# Patient Record
Sex: FEMALE
[De-identification: names, ages, dates, MRNs, and addresses within clinical notes are randomized per-mention and may not be internally consistent; named-entity substitution may affect disease eponyms.]

---

## 2017-01-09 ENCOUNTER — CHARTING TRANS (OUTPATIENT)
Dept: OTHER | Age: 82
End: 2017-01-09

## 2017-01-09 ENCOUNTER — LAB SERVICES (OUTPATIENT)
Dept: OTHER | Age: 82
End: 2017-01-09

## 2017-01-13 ENCOUNTER — CHARTING TRANS (OUTPATIENT)
Dept: OTHER | Age: 82
End: 2017-01-13

## 2017-01-13 LAB
ALBUMIN SERPL-MCNC: 3.7 G/DL (ref 3.6–5.1)
ALBUMIN/GLOB SERPL: 1.1 (ref 1–2.4)
ALP SERPL-CCNC: 89 UNITS/L (ref 45–117)
ALT SERPL-CCNC: 13 UNITS/L
ANION GAP SERPL CALC-SCNC: 14 MMOL/L (ref 10–20)
AST SERPL-CCNC: 16 UNITS/L
BASOPHILS # BLD: 0 K/MCL (ref 0–0.3)
BASOPHILS NFR BLD: 1 %
BILIRUB SERPL-MCNC: 0.6 MG/DL (ref 0.2–1)
BUN SERPL-MCNC: 19 MG/DL (ref 10–20)
BUN/CREAT SERPL: 25 (ref 7–25)
CALCIUM SERPL-MCNC: 9.2 MG/DL (ref 8.4–10.2)
CHLORIDE SERPL-SCNC: 106 MMOL/L (ref 98–107)
CHOLEST SERPL-MCNC: 188 MG/DL (ref 100–200)
CHOLEST/HDLC SERPL: 3.5
CO2 SERPL-SCNC: 26 MMOL/L (ref 21–32)
CREAT SERPL-MCNC: 0.76 MG/DL (ref 0.51–0.95)
CREAT UR-MCNC: 156 MG/DL
DIFFERENTIAL METHOD BLD: NORMAL
EOSINOPHIL # BLD: 0.1 K/MCL (ref 0.1–0.5)
EOSINOPHIL NFR BLD: 2 %
ERYTHROCYTE [DISTWIDTH] IN BLOOD: 13.9 % (ref 11–15)
GLOBULIN SER-MCNC: 3.4 G/DL (ref 2–4)
GLUCOSE SERPL-MCNC: 80 MG/DL (ref 65–99)
HBA1C MFR BLD: 6.3 % (ref 4.5–5.6)
HDLC SERPL-MCNC: 54 MG/DL
HEMATOCRIT: 38.2 % (ref 36–46.5)
HEMOGLOBIN: 12 G/DL (ref 12–15.5)
LDLC SERPL CALC-MCNC: 97 MG/DL
LENGTH OF FAST TIME PATIENT: ABNORMAL HRS
LENGTH OF FAST TIME PATIENT: NORMAL HRS
LYMPHOCYTES # BLD: 1.6 K/MCL (ref 1–4)
LYMPHOCYTES NFR BLD: 28 %
MEAN CORPUSCULAR HEMOGLOBIN: 28.1 PG (ref 26–34)
MEAN CORPUSCULAR HGB CONC: 31.4 G/DL (ref 32–36.5)
MEAN CORPUSCULAR VOLUME: 89.5 FL (ref 78–100)
MONOCYTES # BLD: 0.4 K/MCL (ref 0.3–0.9)
MONOCYTES NFR BLD: 7 %
NEUTROPHILS # BLD: 3.5 K/MCL (ref 1.8–7.7)
NEUTROPHILS NFR BLD: 62 %
NONHDLC SERPL-MCNC: 134 MG/DL
PLATELET COUNT: 273 K/MCL (ref 140–450)
POTASSIUM SERPL-SCNC: 4.1 MMOL/L (ref 3.4–5.1)
PROT UR-MCNC: 14 MG/DL
PROT/CREAT UR: 90 MGPR/GCR
RED CELL COUNT: 4.27 MIL/MCL (ref 4–5.2)
SODIUM SERPL-SCNC: 142 MMOL/L (ref 135–145)
TOTAL PROTEIN: 7.1 G/DL (ref 6.4–8.2)
TRIGL SERPL-MCNC: 186 MG/DL
TSH SERPL-ACNC: 1.05 MCUNITS/ML (ref 0.35–5)
WHITE BLOOD COUNT: 5.6 K/MCL (ref 4.2–11)

## 2017-01-22 ENCOUNTER — APPOINTMENT (OUTPATIENT)
Dept: CT IMAGING | Facility: HOSPITAL | Age: 82
End: 2017-01-22
Attending: EMERGENCY MEDICINE
Payer: MEDICARE

## 2017-01-22 ENCOUNTER — APPOINTMENT (OUTPATIENT)
Dept: GENERAL RADIOLOGY | Facility: HOSPITAL | Age: 82
End: 2017-01-22
Attending: EMERGENCY MEDICINE
Payer: MEDICARE

## 2017-01-22 ENCOUNTER — HOSPITAL ENCOUNTER (EMERGENCY)
Facility: HOSPITAL | Age: 82
Discharge: HOME | End: 2017-01-22
Attending: EMERGENCY MEDICINE
Payer: MEDICARE

## 2017-01-22 VITALS
OXYGEN SATURATION: 95 % | SYSTOLIC BLOOD PRESSURE: 138 MMHG | BODY MASS INDEX: 18.89 KG/M2 | RESPIRATION RATE: 16 BRPM | WEIGHT: 90 LBS | DIASTOLIC BLOOD PRESSURE: 44 MMHG | HEART RATE: 65 BPM | HEIGHT: 58 IN

## 2017-01-22 DIAGNOSIS — M54.50 BACK PAIN, LUMBOSACRAL: Primary | ICD-10-CM

## 2017-01-22 LAB
ALBUMIN SERPL BCP-MCNC: 3.4 G/DL (ref 3.5–4.8)
ALP SERPL-CCNC: 141 U/L (ref 32–100)
ALT SERPL-CCNC: 24 U/L (ref 14–54)
ANION GAP SERPL CALC-SCNC: 4 MMOL/L (ref 0–18)
AST SERPL-CCNC: 28 U/L (ref 15–41)
BASOPHILS # BLD: 0.1 K/UL (ref 0–0.2)
BASOPHILS NFR BLD: 1 %
BILIRUB DIRECT SERPL-MCNC: 0.1 MG/DL (ref 0–0.2)
BILIRUB SERPL-MCNC: 1 MG/DL (ref 0.3–1.2)
BILIRUB UR QL: NEGATIVE
BUN SERPL-MCNC: 20 MG/DL (ref 8–20)
BUN/CREAT SERPL: 22.7 (ref 10–20)
CALCIUM SERPL-MCNC: 9.3 MG/DL (ref 8.5–10.5)
CHLORIDE SERPL-SCNC: 106 MMOL/L (ref 95–110)
CLARITY UR: CLEAR
CO2 SERPL-SCNC: 30 MMOL/L (ref 22–32)
COLOR UR: YELLOW
CREAT SERPL-MCNC: 0.88 MG/DL (ref 0.5–1.5)
EOSINOPHIL # BLD: 0.2 K/UL (ref 0–0.7)
EOSINOPHIL NFR BLD: 3 %
ERYTHROCYTE [DISTWIDTH] IN BLOOD BY AUTOMATED COUNT: 14.5 % (ref 11–15)
GLUCOSE SERPL-MCNC: 105 MG/DL (ref 70–99)
HCT VFR BLD AUTO: 35.1 % (ref 35–48)
HGB BLD-MCNC: 11.5 G/DL (ref 12–16)
HYALINE CASTS #/AREA URNS AUTO: 1 /LPF
KETONES UR-MCNC: NEGATIVE MG/DL
LEUKOCYTE ESTERASE UR QL STRIP.AUTO: NEGATIVE
LYMPHOCYTES # BLD: 1.2 K/UL (ref 1–4)
LYMPHOCYTES NFR BLD: 20 %
MCH RBC QN AUTO: 28.2 PG (ref 27–32)
MCHC RBC AUTO-ENTMCNC: 32.8 G/DL (ref 32–37)
MCV RBC AUTO: 86 FL (ref 80–100)
MONOCYTES # BLD: 0.6 K/UL (ref 0–1)
MONOCYTES NFR BLD: 9 %
NEUTROPHILS # BLD AUTO: 4.2 K/UL (ref 1.8–7.7)
NEUTROPHILS NFR BLD: 67 %
NITRITE UR QL STRIP.AUTO: NEGATIVE
OSMOLALITY UR CALC.SUM OF ELEC: 293 MOSM/KG (ref 275–295)
PH UR: 5 [PH] (ref 5–8)
PLATELET # BLD AUTO: 235 K/UL (ref 140–400)
PMV BLD AUTO: 8.3 FL (ref 7.4–10.3)
POTASSIUM SERPL-SCNC: 3.5 MMOL/L (ref 3.3–5.1)
PROT SERPL-MCNC: 7 G/DL (ref 5.9–8.4)
PROT UR-MCNC: NEGATIVE MG/DL
RBC # BLD AUTO: 4.08 M/UL (ref 3.7–5.4)
RBC #/AREA URNS AUTO: 4 /HPF
SODIUM SERPL-SCNC: 140 MMOL/L (ref 136–144)
SP GR UR STRIP: 1.02 (ref 1–1.03)
UROBILINOGEN UR STRIP-ACNC: <2
VIT C UR-MCNC: NEGATIVE MG/DL
WBC # BLD AUTO: 6.2 K/UL (ref 4–11)
WBC #/AREA URNS AUTO: 3 /HPF

## 2017-01-22 PROCEDURE — 36415 COLL VENOUS BLD VENIPUNCTURE: CPT

## 2017-01-22 PROCEDURE — 80076 HEPATIC FUNCTION PANEL: CPT | Performed by: EMERGENCY MEDICINE

## 2017-01-22 PROCEDURE — 99284 EMERGENCY DEPT VISIT MOD MDM: CPT

## 2017-01-22 PROCEDURE — 81001 URINALYSIS AUTO W/SCOPE: CPT | Performed by: EMERGENCY MEDICINE

## 2017-01-22 PROCEDURE — 85025 COMPLETE CBC W/AUTO DIFF WBC: CPT | Performed by: EMERGENCY MEDICINE

## 2017-01-22 PROCEDURE — 74177 CT ABD & PELVIS W/CONTRAST: CPT

## 2017-01-22 PROCEDURE — 80048 BASIC METABOLIC PNL TOTAL CA: CPT | Performed by: EMERGENCY MEDICINE

## 2017-01-22 PROCEDURE — 72100 X-RAY EXAM L-S SPINE 2/3 VWS: CPT

## 2017-01-22 RX ORDER — ACETAMINOPHEN 325 MG/1
650 TABLET ORAL ONCE
Status: COMPLETED | OUTPATIENT
Start: 2017-01-22 | End: 2017-01-22

## 2017-01-22 RX ORDER — HYDROCODONE BITARTRATE AND ACETAMINOPHEN 5; 325 MG/1; MG/1
1 TABLET ORAL EVERY 6 HOURS PRN
Qty: 8 TABLET | Refills: 0 | Status: SHIPPED | OUTPATIENT
Start: 2017-01-22 | End: 2017-01-29

## 2017-01-22 RX ORDER — HYDROCODONE BITARTRATE AND ACETAMINOPHEN 5; 325 MG/1; MG/1
1 TABLET ORAL ONCE
Status: COMPLETED | OUTPATIENT
Start: 2017-01-22 | End: 2017-01-22

## 2017-01-22 NOTE — ED PROVIDER NOTES
Patient Seen in: [facility] AND [facility] Emergency Department    History   Patient presents with:  Back Pain (musculoskeletal)    Stated Complaint:     HPI    27-year-old female presents for evaluation of back pain.   Patient reports left lower back pain for th 1 TABLET DAILY   Calcium 600 MG Oral Tab,  1 TABLET TWICE DAILY WITH FOOD   Zafirlukast 20 MG Oral Tab,  1 TABLET TWICE DAILY ON EMPTY STOMACH       History reviewed. No pertinent family history.       Smoking Status: Former Smoker                   Packs/D intact throughout   Skin: Skin is warm and dry. No rash noted. Psychiatric: She has a normal mood and affect. Nursing note and vitals reviewed.           ED Course     Labs Reviewed   BASIC METABOLIC PANEL (8) - Abnormal; Notable for the following: bowel resection with reanastomosis in the region of the mid transverse colon.  No obstruction or inflammation of the bowel. 5.  Coronary artery calcifications.     XR lumbar spine CONCLUSION:    The osseous structures are demineralized.  There are vertebra

## 2017-01-22 NOTE — ED INITIAL ASSESSMENT (HPI)
Pt from home, lives at home alone, ambulates with steady gait. C/o back pain, has chronic back pain that has not changed recently. Pt denies SOB/CP.  pts daughter didn't want to bring patient to the ER and felt it would be less of a wait to come by ambulanc

## 2017-01-24 ENCOUNTER — HOSPITAL (OUTPATIENT)
Dept: OTHER | Age: 82
End: 2017-01-24
Attending: INTERNAL MEDICINE

## 2017-01-24 ENCOUNTER — IMAGING SERVICES (OUTPATIENT)
Dept: OTHER | Age: 82
End: 2017-01-24

## 2017-01-24 LAB
ALBUMIN SERPL-MCNC: 3.6 GM/DL (ref 3.6–5.1)
ALP SERPL-CCNC: 158 UNIT/L (ref 45–117)
ALT SERPL-CCNC: 24 UNIT/L
ANALYZER ANC (IANC): ABNORMAL
ANION GAP SERPL CALC-SCNC: 11 MMOL/L (ref 10–20)
AST SERPL-CCNC: 18 UNIT/L
BASOPHILS # BLD: 0 THOUSAND/MCL (ref 0–0.3)
BASOPHILS NFR BLD: 0 %
BILIRUB CONJ SERPL-MCNC: 0.1 MG/DL (ref 0–0.2)
BILIRUB SERPL-MCNC: 0.5 MG/DL (ref 0.2–1)
BUN SERPL-MCNC: 17 MG/DL (ref 10–20)
BUN/CREAT SERPL: 20 (ref 7–25)
CALCIUM SERPL-MCNC: 9.6 MG/DL (ref 8.4–10.2)
CHLORIDE: 106 MMOL/L (ref 98–107)
CO2 SERPL-SCNC: 29 MMOL/L (ref 21–32)
CREAT SERPL-MCNC: 0.87 MG/DL (ref 0.51–0.95)
DIFFERENTIAL METHOD BLD: ABNORMAL
EOSINOPHIL # BLD: 0.2 THOUSAND/MCL (ref 0.1–0.5)
EOSINOPHIL NFR BLD: 4 %
ERYTHROCYTE [DISTWIDTH] IN BLOOD: 13.9 % (ref 11–15)
GLUCOSE SERPL-MCNC: 146 MG/DL (ref 65–99)
HEMATOCRIT: 37.4 % (ref 36–46.5)
HGB BLD-MCNC: 12.3 GM/DL (ref 12–15.5)
LACTATE BLDV-MCNC: 1.5 MMOL/L
LIPASE SERPL-CCNC: 118 UNIT/L (ref 73–393)
LYMPHOCYTES # BLD: 0.9 THOUSAND/MCL (ref 1–4)
LYMPHOCYTES NFR BLD: 15 %
MCH RBC QN AUTO: 28.9 PG (ref 26–34)
MCHC RBC AUTO-ENTMCNC: 32.9 GM/DL (ref 32–36.5)
MCV RBC AUTO: 88 FL (ref 78–100)
MONOCYTES # BLD: 0.5 THOUSAND/MCL (ref 0.3–0.9)
MONOCYTES NFR BLD: 7 %
NEUTROPHILS # BLD: 4.7 THOUSAND/MCL (ref 1.8–7.7)
NEUTROPHILS NFR BLD: 74 %
NEUTS SEG NFR BLD: ABNORMAL %
PERCENT NRBC: ABNORMAL
PLATELET # BLD: 304 THOUSAND/MCL (ref 140–450)
POTASSIUM SERPL-SCNC: 3.3 MMOL/L (ref 3.4–5.1)
PROT SERPL-MCNC: 7.8 GM/DL (ref 6.4–8.2)
RBC # BLD: 4.25 MILLION/MCL (ref 4–5.2)
SODIUM SERPL-SCNC: 143 MMOL/L (ref 135–145)
WBC # BLD: 6.3 THOUSAND/MCL (ref 4.2–11)

## 2017-01-25 LAB
CREAT SERPL-MCNC: 0.78 MG/DL (ref 0.51–0.95)
GLUCOSE BLDC GLUCOMTR-MCNC: 101 MG/DL (ref 65–99)
GLUCOSE BLDC GLUCOMTR-MCNC: 144 MG/DL (ref 65–99)
GLUCOSE BLDC GLUCOMTR-MCNC: 70 MG/DL (ref 65–99)
GLUCOSE BLDC GLUCOMTR-MCNC: 84 MG/DL (ref 65–99)
GLUCOSE BLDC GLUCOMTR-MCNC: 85 MG/DL (ref 65–99)
MAGNESIUM SERPL-MCNC: 1.9 MG/DL (ref 1.7–2.4)
POTASSIUM SERPL-SCNC: 3.5 MMOL/L (ref 3.4–5.1)

## 2017-01-26 ENCOUNTER — CHARTING TRANS (OUTPATIENT)
Dept: OTHER | Age: 82
End: 2017-01-26

## 2017-01-26 LAB
CREAT SERPL-MCNC: 0.73 MG/DL (ref 0.51–0.95)
GLUCOSE BLDC GLUCOMTR-MCNC: 100 MG/DL (ref 65–99)
GLUCOSE BLDC GLUCOMTR-MCNC: 84 MG/DL (ref 65–99)
POTASSIUM SERPL-SCNC: 4.4 MMOL/L (ref 3.4–5.1)

## 2017-09-11 ENCOUNTER — LAB SERVICES (OUTPATIENT)
Dept: OTHER | Age: 82
End: 2017-09-11

## 2017-09-11 ENCOUNTER — IMAGING SERVICES (OUTPATIENT)
Dept: OTHER | Age: 82
End: 2017-09-11

## 2017-09-11 ENCOUNTER — HOSPITAL (OUTPATIENT)
Dept: OTHER | Age: 82
End: 2017-09-11
Attending: INTERNAL MEDICINE

## 2017-09-11 LAB
ANALYZER ANC (IANC): ABNORMAL
ANALYZER ANC (IANC): ABNORMAL
ANION GAP SERPL CALC-SCNC: 13 MMOL/L (ref 10–20)
ANION GAP SERPL CALC-SCNC: 13 MMOL/L (ref 10–20)
BASOPHILS # BLD: 0 K/MCL (ref 0–0.3)
BASOPHILS # BLD: 0 THOUSAND/MCL (ref 0–0.3)
BASOPHILS NFR BLD: 0 %
BASOPHILS NFR BLD: 0 %
BUN SERPL-MCNC: 16 MG/DL (ref 6–20)
BUN SERPL-MCNC: 16 MG/DL (ref 6–20)
BUN/CREAT SERPL: 18 (ref 7–25)
BUN/CREAT SERPL: 18 (ref 7–25)
CALCIUM SERPL-MCNC: 9 MG/DL (ref 8.4–10.2)
CALCIUM SERPL-MCNC: 9 MG/DL (ref 8.4–10.2)
CHLORIDE SERPL-SCNC: 106 MMOL/L (ref 98–107)
CHLORIDE: 106 MMOL/L (ref 98–107)
CO2 SERPL-SCNC: 27 MMOL/L (ref 21–32)
CO2 SERPL-SCNC: 27 MMOL/L (ref 21–32)
CREAT SERPL-MCNC: 0.91 MG/DL (ref 0.51–0.95)
CREAT SERPL-MCNC: 0.91 MG/DL (ref 0.51–0.95)
DIFFERENTIAL METHOD BLD: ABNORMAL
DIFFERENTIAL METHOD BLD: ABNORMAL
EOSINOPHIL # BLD: 0.1 K/MCL (ref 0.1–0.5)
EOSINOPHIL # BLD: 0.1 THOUSAND/MCL (ref 0.1–0.5)
EOSINOPHIL NFR BLD: 2 %
EOSINOPHIL NFR BLD: 2 %
ERYTHROCYTE [DISTWIDTH] IN BLOOD: 13.2 % (ref 11–15)
ERYTHROCYTE [DISTWIDTH] IN BLOOD: 13.2 % (ref 11–15)
GLUCOSE SERPL-MCNC: 94 MG/DL (ref 65–99)
GLUCOSE SERPL-MCNC: 94 MG/DL (ref 65–99)
HEMATOCRIT: 33.7 % (ref 36–46.5)
HEMATOCRIT: 33.7 % (ref 36–46.5)
HEMOGLOBIN: 11.1 G/DL (ref 12–15.5)
HGB BLD-MCNC: 11.1 GM/DL (ref 12–15.5)
LYMPHOCYTES # BLD: 1.6 K/MCL (ref 1–4)
LYMPHOCYTES # BLD: 1.6 THOUSAND/MCL (ref 1–4)
LYMPHOCYTES NFR BLD: 27 %
LYMPHOCYTES NFR BLD: 27 %
MAGNESIUM SERPL-MCNC: 2 MG/DL (ref 1.7–2.4)
MAGNESIUM SERPL-MCNC: 2 MG/DL (ref 1.7–2.4)
MCH RBC QN AUTO: 29.2 PG (ref 26–34)
MCHC RBC AUTO-ENTMCNC: 32.9 GM/DL (ref 32–36.5)
MCV RBC AUTO: 88.7 FL (ref 78–100)
MEAN CORPUSCULAR HEMOGLOBIN: 29.2 PG (ref 26–34)
MEAN CORPUSCULAR HGB CONC: 32.9 G/DL (ref 32–36.5)
MEAN CORPUSCULAR VOLUME: 88.7 FL (ref 78–100)
MONOCYTES # BLD: 0.5 K/MCL (ref 0.3–0.9)
MONOCYTES # BLD: 0.5 THOUSAND/MCL (ref 0.3–0.9)
MONOCYTES NFR BLD: 8 %
MONOCYTES NFR BLD: 8 %
NEUTROPHILS # BLD: 3.9 K/MCL (ref 1.8–7.7)
NEUTROPHILS # BLD: 3.9 THOUSAND/MCL (ref 1.8–7.7)
NEUTROPHILS NFR BLD: 63 %
NEUTROPHILS NFR BLD: 63 %
NEUTS SEG NFR BLD: ABNORMAL
NEUTS SEG NFR BLD: ABNORMAL %
NRBC (NRBCRE): ABNORMAL
PERCENT NRBC: ABNORMAL
PLATELET # BLD: 196 THOUSAND/MCL (ref 140–450)
PLATELET COUNT: 196 K/MCL (ref 140–450)
POTASSIUM SERPL-SCNC: 4.3 MMOL/L (ref 3.4–5.1)
POTASSIUM SERPL-SCNC: 4.3 MMOL/L (ref 3.4–5.1)
RBC # BLD: 3.8 MILLION/MCL (ref 4–5.2)
RED CELL COUNT: 3.8 MIL/MCL (ref 4–5.2)
SODIUM SERPL-SCNC: 142 MMOL/L (ref 135–145)
SODIUM SERPL-SCNC: 142 MMOL/L (ref 135–145)
WBC # BLD: 6.1 THOUSAND/MCL (ref 4.2–11)
WHITE BLOOD COUNT: 6.1 K/MCL (ref 4.2–11)

## 2017-09-12 LAB
ALBUMIN SERPL-MCNC: 2.6 GM/DL (ref 3.6–5.1)
ALBUMIN/GLOB SERPL: 0.8 {RATIO} (ref 1–2.4)
ALP SERPL-CCNC: 111 UNIT/L (ref 45–117)
ALT SERPL-CCNC: 48 UNIT/L
ANALYZER ANC (IANC): ABNORMAL
ANION GAP SERPL CALC-SCNC: 9 MMOL/L (ref 10–20)
AST SERPL-CCNC: 78 UNIT/L
BASOPHILS # BLD: 0 THOUSAND/MCL (ref 0–0.3)
BASOPHILS NFR BLD: 0 %
BILIRUB SERPL-MCNC: 0.7 MG/DL (ref 0.2–1)
BUN SERPL-MCNC: 13 MG/DL (ref 6–20)
BUN/CREAT SERPL: 15 (ref 7–25)
CALCIUM SERPL-MCNC: 8.5 MG/DL (ref 8.4–10.2)
CHLORIDE: 109 MMOL/L (ref 98–107)
CO2 SERPL-SCNC: 29 MMOL/L (ref 21–32)
CREAT SERPL-MCNC: 0.88 MG/DL (ref 0.51–0.95)
DIFFERENTIAL METHOD BLD: ABNORMAL
EOSINOPHIL # BLD: 0.1 THOUSAND/MCL (ref 0.1–0.5)
EOSINOPHIL NFR BLD: 3 %
ERYTHROCYTE [DISTWIDTH] IN BLOOD: 13.1 % (ref 11–15)
GLOBULIN SER-MCNC: 3.1 GM/DL (ref 2–4)
GLUCOSE SERPL-MCNC: 84 MG/DL (ref 65–99)
GLYCOHEMOGLOBIN: 6.1 % (ref 4.5–5.6)
HEMATOCRIT: 32.8 % (ref 36–46.5)
HGB BLD-MCNC: 10.6 GM/DL (ref 12–15.5)
LYMPHOCYTES # BLD: 1.1 THOUSAND/MCL (ref 1–4)
LYMPHOCYTES NFR BLD: 26 %
MCH RBC QN AUTO: 29 PG (ref 26–34)
MCHC RBC AUTO-ENTMCNC: 32.3 GM/DL (ref 32–36.5)
MCV RBC AUTO: 89.9 FL (ref 78–100)
MONOCYTES # BLD: 0.4 THOUSAND/MCL (ref 0.3–0.9)
MONOCYTES NFR BLD: 9 %
NEUTROPHILS # BLD: 2.6 THOUSAND/MCL (ref 1.8–7.7)
NEUTROPHILS NFR BLD: 62 %
NEUTS SEG NFR BLD: ABNORMAL %
PERCENT NRBC: ABNORMAL
PLATELET # BLD: 180 THOUSAND/MCL (ref 140–450)
POTASSIUM SERPL-SCNC: 4 MMOL/L (ref 3.4–5.1)
PROT SERPL-MCNC: 5.7 GM/DL (ref 6.4–8.2)
RBC # BLD: 3.65 MILLION/MCL (ref 4–5.2)
SODIUM SERPL-SCNC: 143 MMOL/L (ref 135–145)
TSH SERPL-ACNC: 0.92 MCUNIT/ML (ref 0.35–5)
WBC # BLD: 4.2 THOUSAND/MCL (ref 4.2–11)

## 2018-02-20 ENCOUNTER — LAB SERVICES (OUTPATIENT)
Dept: OTHER | Age: 83
End: 2018-02-20

## 2018-02-20 ENCOUNTER — CHARTING TRANS (OUTPATIENT)
Dept: OTHER | Age: 83
End: 2018-02-20

## 2018-02-20 ASSESSMENT — PAIN SCALES - GENERAL: PAINLEVEL_OUTOF10: 8

## 2018-02-21 LAB
ALBUMIN SERPL-MCNC: 3.7 G/DL (ref 3.6–5.1)
ALBUMIN/GLOB SERPL: 1.1 (ref 1–2.4)
ALP SERPL-CCNC: 97 UNITS/L (ref 45–117)
ALT SERPL-CCNC: 16 UNITS/L
ANION GAP SERPL CALC-SCNC: 11 MMOL/L (ref 10–20)
AST SERPL-CCNC: 22 UNITS/L
BASOPHILS # BLD: 0 K/MCL (ref 0–0.3)
BASOPHILS NFR BLD: 1 %
BILIRUB SERPL-MCNC: 0.6 MG/DL (ref 0.2–1)
BUN SERPL-MCNC: 19 MG/DL (ref 6–20)
BUN/CREAT SERPL: 21 (ref 7–25)
CALCIUM SERPL-MCNC: 9 MG/DL (ref 8.4–10.2)
CHLORIDE SERPL-SCNC: 102 MMOL/L (ref 98–107)
CO2 SERPL-SCNC: 31 MMOL/L (ref 21–32)
CREAT SERPL-MCNC: 0.9 MG/DL (ref 0.51–0.95)
DIFFERENTIAL METHOD BLD: ABNORMAL
EOSINOPHIL # BLD: 0.1 K/MCL (ref 0.1–0.5)
EOSINOPHIL NFR BLD: 2 %
ERYTHROCYTE [DISTWIDTH] IN BLOOD: 13.9 % (ref 11–15)
FOLATE SERPL-MCNC: 18.4 NG/ML
GLOBULIN SER-MCNC: 3.4 G/DL (ref 2–4)
GLUCOSE SERPL-MCNC: 77 MG/DL (ref 65–99)
HEMATOCRIT: 40.3 % (ref 36–46.5)
HEMOGLOBIN: 12.8 G/DL (ref 12–15.5)
LENGTH OF FAST TIME PATIENT: 2 HRS
LYMPHOCYTES # BLD: 1.5 K/MCL (ref 1–4)
LYMPHOCYTES NFR BLD: 23 %
MEAN CORPUSCULAR HEMOGLOBIN: 29.3 PG (ref 26–34)
MEAN CORPUSCULAR HGB CONC: 31.8 G/DL (ref 32–36.5)
MEAN CORPUSCULAR VOLUME: 92.2 FL (ref 78–100)
MONOCYTES # BLD: 0.6 K/MCL (ref 0.3–0.9)
MONOCYTES NFR BLD: 9 %
NEUTROPHILS # BLD: 4.3 K/MCL (ref 1.8–7.7)
NEUTROPHILS NFR BLD: 65 %
PLATELET COUNT: 322 K/MCL (ref 140–450)
POTASSIUM SERPL-SCNC: 5.1 MMOL/L (ref 3.4–5.1)
RED CELL COUNT: 4.37 MIL/MCL (ref 4–5.2)
SODIUM SERPL-SCNC: 139 MMOL/L (ref 135–145)
TOTAL PROTEIN: 7.1 G/DL (ref 6.4–8.2)
TSH SERPL-ACNC: 1.63 MCUNITS/ML (ref 0.35–5)
VIT B12 SERPL-MCNC: 278 PG/ML (ref 211–911)
WHITE BLOOD COUNT: 6.5 K/MCL (ref 4.2–11)

## 2018-03-20 ENCOUNTER — CHARTING TRANS (OUTPATIENT)
Dept: OTHER | Age: 83
End: 2018-03-20

## 2018-03-20 ASSESSMENT — PAIN SCALES - GENERAL: PAINLEVEL_OUTOF10: 0

## 2018-06-19 ENCOUNTER — CHARTING TRANS (OUTPATIENT)
Dept: OTHER | Age: 83
End: 2018-06-19

## 2018-06-19 ASSESSMENT — PAIN SCALES - GENERAL: PAINLEVEL_OUTOF10: 0

## 2018-11-01 VITALS
TEMPERATURE: 98.1 F | RESPIRATION RATE: 17 BRPM | OXYGEN SATURATION: 92 % | WEIGHT: 92 LBS | HEART RATE: 91 BPM | BODY MASS INDEX: 20.7 KG/M2 | HEIGHT: 56 IN

## 2018-11-01 VITALS
TEMPERATURE: 98.1 F | WEIGHT: 95 LBS | RESPIRATION RATE: 17 BRPM | OXYGEN SATURATION: 94 % | HEART RATE: 74 BPM | HEIGHT: 56 IN | BODY MASS INDEX: 21.37 KG/M2

## 2018-11-01 VITALS
HEART RATE: 77 BPM | RESPIRATION RATE: 16 BRPM | WEIGHT: 92 LBS | TEMPERATURE: 98.2 F | OXYGEN SATURATION: 92 % | BODY MASS INDEX: 20.7 KG/M2 | HEIGHT: 56 IN

## 2018-11-07 ENCOUNTER — CHARTING TRANS (OUTPATIENT)
Dept: OTHER | Age: 83
End: 2018-11-07

## 2018-11-07 ASSESSMENT — PAIN SCALES - GENERAL: PAINLEVEL_OUTOF10: 0

## 2018-11-27 VITALS
TEMPERATURE: 97.9 F | BODY MASS INDEX: 18.67 KG/M2 | OXYGEN SATURATION: 93 % | HEART RATE: 70 BPM | WEIGHT: 83 LBS | HEIGHT: 56 IN | RESPIRATION RATE: 16 BRPM

## 2019-01-01 ENCOUNTER — EXTERNAL RECORD (OUTPATIENT)
Dept: HEALTH INFORMATION MANAGEMENT | Facility: OTHER | Age: 84
End: 2019-01-01

## 2019-01-05 RX ORDER — OMEPRAZOLE 20 MG/1
CAPSULE, DELAYED RELEASE ORAL
Qty: 90 CAPSULE | Refills: 3 | Status: SHIPPED | OUTPATIENT
Start: 2019-01-05 | End: 2020-01-31 | Stop reason: SDUPTHER

## 2019-01-31 RX ORDER — RISPERIDONE 0.5 MG/1
TABLET ORAL
COMMUNITY
Start: 2018-11-21 | End: 2019-03-06 | Stop reason: ALTCHOICE

## 2019-01-31 RX ORDER — ALENDRONATE SODIUM 70 MG/1
TABLET ORAL
COMMUNITY
Start: 2018-02-13 | End: 2019-01-31 | Stop reason: SDUPTHER

## 2019-01-31 RX ORDER — CHOLECALCIFEROL (VITAMIN D3) 25 MCG
TABLET,CHEWABLE ORAL
COMMUNITY
Start: 2018-02-21 | End: 2019-01-31 | Stop reason: SDUPTHER

## 2019-01-31 RX ORDER — LEVOTHYROXINE SODIUM 0.05 MG/1
TABLET ORAL
COMMUNITY
End: 2019-03-05 | Stop reason: SDUPTHER

## 2019-02-05 RX ORDER — SERTRALINE HYDROCHLORIDE 25 MG/1
TABLET, FILM COATED ORAL
Qty: 30 TABLET | Refills: 11 | Status: SHIPPED | OUTPATIENT
Start: 2019-02-05 | End: 2020-03-03 | Stop reason: SDUPTHER

## 2019-02-05 RX ORDER — CHOLECALCIFEROL (VITAMIN D3) 25 MCG
TABLET,CHEWABLE ORAL
Qty: 30 CAPSULE | Refills: 11 | Status: SHIPPED | OUTPATIENT
Start: 2019-02-05 | End: 2019-05-23 | Stop reason: ALTCHOICE

## 2019-02-05 RX ORDER — ALENDRONATE SODIUM 70 MG/1
TABLET ORAL
Qty: 13 TABLET | Refills: 3 | Status: SHIPPED | OUTPATIENT
Start: 2019-02-05 | End: 2019-03-06 | Stop reason: ALTCHOICE

## 2019-02-20 ENCOUNTER — TELEPHONE (OUTPATIENT)
Dept: SCHEDULING | Age: 84
End: 2019-02-20

## 2019-02-20 RX ORDER — ZAFIRLUKAST 20 MG/1
TABLET, FILM COATED ORAL
Qty: 60 TABLET | Refills: 11 | Status: SHIPPED | OUTPATIENT
Start: 2019-02-20 | End: 2019-07-17 | Stop reason: SDUPTHER

## 2019-03-05 RX ORDER — LEVOTHYROXINE SODIUM 0.05 MG/1
TABLET ORAL
Qty: 90 TABLET | Refills: 3 | Status: SHIPPED | OUTPATIENT
Start: 2019-03-05 | End: 2020-10-06 | Stop reason: SDUPTHER

## 2019-03-06 ENCOUNTER — OFFICE VISIT (OUTPATIENT)
Dept: INTERNAL MEDICINE | Age: 84
End: 2019-03-06

## 2019-03-06 ENCOUNTER — LAB SERVICES (OUTPATIENT)
Dept: LAB | Age: 84
End: 2019-03-06

## 2019-03-06 DIAGNOSIS — J45.20 MILD INTERMITTENT ASTHMA WITHOUT COMPLICATION: ICD-10-CM

## 2019-03-06 DIAGNOSIS — E06.3 HYPOTHYROIDISM DUE TO HASHIMOTO'S THYROIDITIS: ICD-10-CM

## 2019-03-06 DIAGNOSIS — E78.00 PURE HYPERCHOLESTEROLEMIA: ICD-10-CM

## 2019-03-06 DIAGNOSIS — E03.8 HYPOTHYROIDISM DUE TO HASHIMOTO'S THYROIDITIS: ICD-10-CM

## 2019-03-06 DIAGNOSIS — R45.1 AGITATION: ICD-10-CM

## 2019-03-06 DIAGNOSIS — F02.81 ALZHEIMER'S DEMENTIA WITH BEHAVIORAL DISTURBANCE, UNSPECIFIED TIMING OF DEMENTIA ONSET: Primary | ICD-10-CM

## 2019-03-06 DIAGNOSIS — N39.3 STRESS INCONTINENCE: ICD-10-CM

## 2019-03-06 DIAGNOSIS — G25.81 RESTLESS LEGS SYNDROME: ICD-10-CM

## 2019-03-06 DIAGNOSIS — K21.9 GASTROESOPHAGEAL REFLUX DISEASE WITHOUT ESOPHAGITIS: ICD-10-CM

## 2019-03-06 DIAGNOSIS — E53.8 VITAMIN B12 DEFICIENCY (NON ANEMIC): ICD-10-CM

## 2019-03-06 DIAGNOSIS — G60.9 IDIOPATHIC PERIPHERAL NEUROPATHY: ICD-10-CM

## 2019-03-06 DIAGNOSIS — I10 HYPERTENSION, BENIGN: ICD-10-CM

## 2019-03-06 DIAGNOSIS — E11.9 TYPE 2 DIABETES MELLITUS WITHOUT COMPLICATION, WITHOUT LONG-TERM CURRENT USE OF INSULIN (CMD): ICD-10-CM

## 2019-03-06 DIAGNOSIS — Z66 DNR (DO NOT RESUSCITATE): ICD-10-CM

## 2019-03-06 DIAGNOSIS — G30.9 ALZHEIMER'S DEMENTIA WITH BEHAVIORAL DISTURBANCE, UNSPECIFIED TIMING OF DEMENTIA ONSET: Primary | ICD-10-CM

## 2019-03-06 DIAGNOSIS — F32.1 CURRENT MODERATE EPISODE OF MAJOR DEPRESSIVE DISORDER, UNSPECIFIED WHETHER RECURRENT (CMD): ICD-10-CM

## 2019-03-06 DIAGNOSIS — Z23 NEED FOR INFLUENZA VACCINATION: ICD-10-CM

## 2019-03-06 DIAGNOSIS — M81.0 AGE-RELATED OSTEOPOROSIS WITHOUT CURRENT PATHOLOGICAL FRACTURE: ICD-10-CM

## 2019-03-06 DIAGNOSIS — R63.0 ANOREXIA: ICD-10-CM

## 2019-03-06 PROBLEM — F03.90 DEMENTIA (CMD): Status: ACTIVE | Noted: 2018-02-20

## 2019-03-06 LAB
ANION GAP SERPL CALC-SCNC: 10 MMOL/L (ref 10–20)
BASOPHILS # BLD AUTO: 0 K/MCL (ref 0–0.3)
BASOPHILS NFR BLD AUTO: 1 %
BUN SERPL-MCNC: 14 MG/DL (ref 6–20)
BUN/CREAT SERPL: 18 (ref 7–25)
CALCIUM SERPL-MCNC: 9 MG/DL (ref 8.4–10.2)
CHLORIDE SERPL-SCNC: 105 MMOL/L (ref 98–107)
CO2 SERPL-SCNC: 29 MMOL/L (ref 21–32)
CREAT SERPL-MCNC: 0.78 MG/DL (ref 0.51–0.95)
DIFFERENTIAL METHOD BLD: ABNORMAL
EOSINOPHIL # BLD AUTO: 0.1 K/MCL (ref 0.1–0.5)
EOSINOPHIL NFR SPEC: 2 %
ERYTHROCYTE [DISTWIDTH] IN BLOOD: 14 % (ref 11–15)
FASTING STATUS PATIENT QL REPORTED: 0 HRS
GLUCOSE SERPL-MCNC: 165 MG/DL (ref 65–99)
HBA1C MFR BLD: 5.8 % (ref 4.5–5.6)
HCT VFR BLD CALC: 41.1 % (ref 36–46.5)
HGB BLD-MCNC: 12.6 G/DL (ref 12–15.5)
IMM GRANULOCYTES # BLD AUTO: 0 K/MCL (ref 0–0.2)
IMM GRANULOCYTES NFR BLD: 0 %
LYMPHOCYTES # BLD MANUAL: 1 K/MCL (ref 1–4)
LYMPHOCYTES NFR BLD MANUAL: 15 %
MCH RBC QN AUTO: 27.5 PG (ref 26–34)
MCHC RBC AUTO-ENTMCNC: 30.7 G/DL (ref 32–36.5)
MCV RBC AUTO: 89.5 FL (ref 78–100)
MONOCYTES # BLD MANUAL: 0.4 K/MCL (ref 0.3–0.9)
MONOCYTES NFR BLD MANUAL: 6 %
NEUTROPHILS # BLD: 4.9 K/MCL (ref 1.8–7.7)
NEUTROPHILS NFR BLD AUTO: 76 %
NRBC BLD MANUAL-RTO: 0 /100 WBC
PLATELET # BLD: 325 K/MCL (ref 140–450)
POTASSIUM SERPL-SCNC: 3.5 MMOL/L (ref 3.4–5.1)
RBC # BLD: 4.59 MIL/MCL (ref 4–5.2)
SODIUM SERPL-SCNC: 141 MMOL/L (ref 135–145)
TSH SERPL-ACNC: 2.23 MCUNITS/ML (ref 0.35–5)
WBC # BLD: 6.4 K/MCL (ref 4.2–11)

## 2019-03-06 PROCEDURE — 99214 OFFICE O/P EST MOD 30 MIN: CPT | Performed by: INTERNAL MEDICINE

## 2019-03-06 PROCEDURE — 90662 IIV NO PRSV INCREASED AG IM: CPT

## 2019-03-06 PROCEDURE — G0008 ADMIN INFLUENZA VIRUS VAC: HCPCS

## 2019-03-06 RX ORDER — QUETIAPINE FUMARATE 25 MG/1
25 TABLET, FILM COATED ORAL
Qty: 30 TABLET | Refills: 11 | Status: SHIPPED | OUTPATIENT
Start: 2019-03-06 | End: 2019-05-23

## 2019-03-06 SDOH — HEALTH STABILITY: MENTAL HEALTH: HOW OFTEN DO YOU HAVE A DRINK CONTAINING ALCOHOL?: NEVER

## 2019-03-06 ASSESSMENT — PAIN SCALES - GENERAL: PAINLEVEL: 0

## 2019-03-06 ASSESSMENT — ENCOUNTER SYMPTOMS
CONSTITUTIONAL NEGATIVE: 1
RESPIRATORY NEGATIVE: 1

## 2019-05-10 ENCOUNTER — TELEPHONE (OUTPATIENT)
Dept: SCHEDULING | Age: 84
End: 2019-05-10

## 2019-05-15 ENCOUNTER — TELEPHONE (OUTPATIENT)
Dept: SCHEDULING | Age: 84
End: 2019-05-15

## 2019-05-15 DIAGNOSIS — J81.0 ACUTE PULMONARY EDEMA (CMD): Primary | ICD-10-CM

## 2019-05-15 RX ORDER — FUROSEMIDE 20 MG/1
20 TABLET ORAL DAILY
Qty: 7 TABLET | Refills: 0 | Status: SHIPPED | OUTPATIENT
Start: 2019-05-15 | End: 2019-05-16 | Stop reason: SDUPTHER

## 2019-05-16 ENCOUNTER — TELEPHONE (OUTPATIENT)
Dept: SCHEDULING | Age: 84
End: 2019-05-16

## 2019-05-16 DIAGNOSIS — J81.0 ACUTE PULMONARY EDEMA (CMD): ICD-10-CM

## 2019-05-16 RX ORDER — FUROSEMIDE 20 MG/1
20 TABLET ORAL DAILY
Qty: 7 TABLET | Refills: 0 | Status: SHIPPED | OUTPATIENT
Start: 2019-05-16 | End: 2019-05-23 | Stop reason: ALTCHOICE

## 2019-05-20 ENCOUNTER — TELEPHONE (OUTPATIENT)
Dept: SCHEDULING | Age: 84
End: 2019-05-20

## 2019-05-21 ENCOUNTER — TELEPHONE (OUTPATIENT)
Dept: SCHEDULING | Age: 84
End: 2019-05-21

## 2019-05-23 ENCOUNTER — OFFICE VISIT (OUTPATIENT)
Dept: INTERNAL MEDICINE | Age: 84
End: 2019-05-23

## 2019-05-23 ENCOUNTER — HOSPITAL (OUTPATIENT)
Dept: OTHER | Age: 84
End: 2019-05-23
Attending: INTERNAL MEDICINE

## 2019-05-23 ENCOUNTER — TELEPHONE (OUTPATIENT)
Dept: SCHEDULING | Age: 84
End: 2019-05-23

## 2019-05-23 ENCOUNTER — LAB SERVICES (OUTPATIENT)
Dept: LAB | Age: 84
End: 2019-05-23

## 2019-05-23 DIAGNOSIS — E53.8 B12 DEFICIENCY: ICD-10-CM

## 2019-05-23 DIAGNOSIS — I50.9 CONGESTIVE HEART FAILURE, UNSPECIFIED HF CHRONICITY, UNSPECIFIED HEART FAILURE TYPE (CMD): ICD-10-CM

## 2019-05-23 DIAGNOSIS — J45.30 MILD PERSISTENT ASTHMA WITHOUT COMPLICATION: Primary | ICD-10-CM

## 2019-05-23 DIAGNOSIS — J45.30 MILD PERSISTENT ASTHMA WITHOUT COMPLICATION: ICD-10-CM

## 2019-05-23 LAB
ANION GAP SERPL CALC-SCNC: 11 MMOL/L (ref 10–20)
BUN SERPL-MCNC: 16 MG/DL (ref 6–20)
BUN/CREAT SERPL: 22 (ref 7–25)
CALCIUM SERPL-MCNC: 9.3 MG/DL (ref 8.4–10.2)
CHLORIDE SERPL-SCNC: 100 MMOL/L (ref 98–107)
CO2 SERPL-SCNC: 32 MMOL/L (ref 21–32)
CREAT SERPL-MCNC: 0.73 MG/DL (ref 0.51–0.95)
FASTING STATUS PATIENT QL REPORTED: 6 HRS
GLUCOSE SERPL-MCNC: 85 MG/DL (ref 65–99)
NT-PROBNP SERPL-MCNC: 178 PG/ML
POTASSIUM SERPL-SCNC: 3.9 MMOL/L (ref 3.4–5.1)
SODIUM SERPL-SCNC: 139 MMOL/L (ref 135–145)

## 2019-05-23 PROCEDURE — 99214 OFFICE O/P EST MOD 30 MIN: CPT | Performed by: INTERNAL MEDICINE

## 2019-05-23 RX ORDER — ALBUTEROL SULFATE 90 UG/1
2 AEROSOL, METERED RESPIRATORY (INHALATION) 3 TIMES DAILY
Qty: 1 INHALER | Refills: 5 | Status: SHIPPED | OUTPATIENT
Start: 2019-05-23 | End: 2019-05-23 | Stop reason: SDUPTHER

## 2019-05-23 RX ORDER — ALBUTEROL SULFATE 90 UG/1
2 AEROSOL, METERED RESPIRATORY (INHALATION) 3 TIMES DAILY
Qty: 1 INHALER | Refills: 5 | Status: SHIPPED | OUTPATIENT
Start: 2019-05-23

## 2019-05-23 RX ORDER — CHOLECALCIFEROL (VITAMIN D3) 25 MCG
1000 TABLET,CHEWABLE ORAL DAILY
Qty: 30 CAPSULE | Refills: 11 | Status: SHIPPED | COMMUNITY
Start: 2019-05-23

## 2019-05-23 ASSESSMENT — ENCOUNTER SYMPTOMS
UNEXPECTED WEIGHT CHANGE: 0
SHORTNESS OF BREATH: 0
COUGH: 0

## 2019-05-23 ASSESSMENT — PAIN SCALES - GENERAL: PAINLEVEL: 0

## 2019-05-29 ENCOUNTER — TELEPHONE (OUTPATIENT)
Dept: SCHEDULING | Age: 84
End: 2019-05-29

## 2019-05-30 ENCOUNTER — TELEPHONE (OUTPATIENT)
Dept: SCHEDULING | Age: 84
End: 2019-05-30

## 2019-06-06 ENCOUNTER — APPOINTMENT (OUTPATIENT)
Dept: INTERNAL MEDICINE | Age: 84
End: 2019-06-06

## 2019-06-10 ENCOUNTER — OFFICE VISIT (OUTPATIENT)
Dept: INTERNAL MEDICINE | Age: 84
End: 2019-06-10

## 2019-06-10 DIAGNOSIS — F02.80 ALZHEIMER'S DEMENTIA WITHOUT BEHAVIORAL DISTURBANCE, UNSPECIFIED TIMING OF DEMENTIA ONSET: Primary | ICD-10-CM

## 2019-06-10 DIAGNOSIS — Z66 DNR (DO NOT RESUSCITATE): ICD-10-CM

## 2019-06-10 DIAGNOSIS — E11.9 TYPE 2 DIABETES MELLITUS WITHOUT COMPLICATION, WITHOUT LONG-TERM CURRENT USE OF INSULIN (CMD): ICD-10-CM

## 2019-06-10 DIAGNOSIS — J43.1 PANLOBULAR EMPHYSEMA (CMD): ICD-10-CM

## 2019-06-10 DIAGNOSIS — R63.0 ANOREXIA: ICD-10-CM

## 2019-06-10 DIAGNOSIS — G30.9 ALZHEIMER'S DEMENTIA WITHOUT BEHAVIORAL DISTURBANCE, UNSPECIFIED TIMING OF DEMENTIA ONSET: Primary | ICD-10-CM

## 2019-06-10 PROBLEM — J43.9 EMPHYSEMA OF LUNG (CMD): Status: ACTIVE | Noted: 2019-06-10

## 2019-06-10 PROCEDURE — 99214 OFFICE O/P EST MOD 30 MIN: CPT | Performed by: INTERNAL MEDICINE

## 2019-06-10 ASSESSMENT — ENCOUNTER SYMPTOMS
FEVER: 0
COUGH: 0
ABDOMINAL DISTENTION: 0

## 2019-06-10 ASSESSMENT — PAIN SCALES - GENERAL: PAINLEVEL: 0

## 2019-06-28 ENCOUNTER — TELEPHONE (OUTPATIENT)
Dept: SCHEDULING | Age: 84
End: 2019-06-28

## 2019-07-02 ENCOUNTER — TELEPHONE (OUTPATIENT)
Dept: INTERNAL MEDICINE | Age: 84
End: 2019-07-02

## 2019-07-02 ENCOUNTER — TELEPHONE (OUTPATIENT)
Dept: SCHEDULING | Age: 84
End: 2019-07-02

## 2019-07-03 ENCOUNTER — TELEPHONE (OUTPATIENT)
Dept: SCHEDULING | Age: 84
End: 2019-07-03

## 2019-07-05 ENCOUNTER — TELEPHONE (OUTPATIENT)
Dept: SCHEDULING | Age: 84
End: 2019-07-05

## 2019-07-05 ENCOUNTER — OFFICE VISIT (OUTPATIENT)
Dept: INTERNAL MEDICINE | Age: 84
End: 2019-07-05

## 2019-07-05 ENCOUNTER — HOSPITAL (OUTPATIENT)
Dept: OTHER | Age: 84
End: 2019-07-05
Attending: INTERNAL MEDICINE

## 2019-07-05 ENCOUNTER — LAB SERVICES (OUTPATIENT)
Dept: LAB | Age: 84
End: 2019-07-05

## 2019-07-05 DIAGNOSIS — M25.551 ACUTE PAIN OF RIGHT HIP: ICD-10-CM

## 2019-07-05 DIAGNOSIS — M25.551 ACUTE PAIN OF RIGHT HIP: Primary | ICD-10-CM

## 2019-07-05 LAB
APPEARANCE UR: ABNORMAL
BACTERIA #/AREA URNS HPF: ABNORMAL /HPF
BILIRUB UR QL STRIP: NEGATIVE
COLOR UR: YELLOW
GLUCOSE UR STRIP-MCNC: NEGATIVE MG/DL
HGB UR QL STRIP: NEGATIVE
HYALINE CASTS #/AREA URNS LPF: ABNORMAL /LPF (ref 0–5)
KETONES UR STRIP-MCNC: NEGATIVE MG/DL
LEUKOCYTE ESTERASE UR QL STRIP: ABNORMAL
MUCOUS THREADS URNS QL MICRO: PRESENT
NITRITE UR QL STRIP: POSITIVE
PH UR STRIP: 7 UNITS (ref 5–7)
PROT UR STRIP-MCNC: NEGATIVE MG/DL
RBC #/AREA URNS HPF: ABNORMAL /HPF (ref 0–2)
SP GR UR STRIP: 1.02 (ref 1–1.03)
SPECIMEN SOURCE: ABNORMAL
SQUAMOUS #/AREA URNS HPF: ABNORMAL /HPF (ref 0–5)
UROBILINOGEN UR STRIP-MCNC: 0.2 MG/DL (ref 0–1)
WBC #/AREA URNS HPF: ABNORMAL /HPF (ref 0–5)

## 2019-07-05 PROCEDURE — 99214 OFFICE O/P EST MOD 30 MIN: CPT | Performed by: INTERNAL MEDICINE

## 2019-07-05 ASSESSMENT — PAIN SCALES - GENERAL: PAINLEVEL: 7-8

## 2019-07-05 ASSESSMENT — ENCOUNTER SYMPTOMS
RESPIRATORY NEGATIVE: 1
ABDOMINAL PAIN: 0
CONSTIPATION: 0
BACK PAIN: 1
FEVER: 0

## 2019-07-06 DIAGNOSIS — N30.00 ACUTE CYSTITIS WITHOUT HEMATURIA: Primary | ICD-10-CM

## 2019-07-06 RX ORDER — NITROFURANTOIN MACROCRYSTALS 100 MG/1
100 CAPSULE ORAL 4 TIMES DAILY
Qty: 28 CAPSULE | Refills: 0 | Status: SHIPPED | OUTPATIENT
Start: 2019-07-06

## 2019-07-10 ENCOUNTER — TELEPHONE (OUTPATIENT)
Dept: SCHEDULING | Age: 84
End: 2019-07-10

## 2019-07-13 ENCOUNTER — TELEPHONE (OUTPATIENT)
Dept: SCHEDULING | Age: 84
End: 2019-07-13

## 2019-07-17 ENCOUNTER — TELEPHONE (OUTPATIENT)
Dept: SCHEDULING | Age: 84
End: 2019-07-17

## 2019-07-17 DIAGNOSIS — J45.20 MILD INTERMITTENT ASTHMA WITHOUT COMPLICATION: Primary | ICD-10-CM

## 2019-07-17 RX ORDER — ZAFIRLUKAST 20 MG/1
20 TABLET, FILM COATED ORAL DAILY
Qty: 30 TABLET | Refills: 11 | Status: SHIPPED | OUTPATIENT
Start: 2019-07-17 | End: 2019-07-26 | Stop reason: ALTCHOICE

## 2019-07-25 ENCOUNTER — TELEPHONE (OUTPATIENT)
Dept: SCHEDULING | Age: 84
End: 2019-07-25

## 2019-07-26 ENCOUNTER — TELEPHONE (OUTPATIENT)
Dept: INTERNAL MEDICINE | Age: 84
End: 2019-07-26

## 2019-10-04 ENCOUNTER — TELEPHONE (OUTPATIENT)
Dept: SCHEDULING | Age: 84
End: 2019-10-04

## 2020-01-01 ENCOUNTER — HOSPITAL ENCOUNTER (INPATIENT)
Facility: HOSPITAL | Age: 85
LOS: 5 days | Discharge: NURSING, REHAB OR LONG-TERM CARE | DRG: 193 | End: 2020-01-01
Attending: EMERGENCY MEDICINE | Admitting: INTERNAL MEDICINE
Payer: MEDICARE

## 2020-01-01 ENCOUNTER — APPOINTMENT (OUTPATIENT)
Dept: GENERAL RADIOLOGY | Facility: HOSPITAL | Age: 85
DRG: 388 | End: 2020-01-01
Attending: HOSPITALIST
Payer: MEDICARE

## 2020-01-01 ENCOUNTER — APPOINTMENT (OUTPATIENT)
Dept: GENERAL RADIOLOGY | Facility: HOSPITAL | Age: 85
DRG: 388 | End: 2020-01-01
Attending: SURGERY
Payer: MEDICARE

## 2020-01-01 ENCOUNTER — APPOINTMENT (OUTPATIENT)
Dept: CT IMAGING | Facility: HOSPITAL | Age: 85
DRG: 193 | End: 2020-01-01
Attending: INTERNAL MEDICINE
Payer: MEDICARE

## 2020-01-01 ENCOUNTER — TELEPHONE (OUTPATIENT)
Dept: SCHEDULING | Age: 85
End: 2020-01-01

## 2020-01-01 ENCOUNTER — APPOINTMENT (OUTPATIENT)
Dept: GENERAL RADIOLOGY | Facility: HOSPITAL | Age: 85
DRG: 388 | End: 2020-01-01
Attending: EMERGENCY MEDICINE
Payer: MEDICARE

## 2020-01-01 ENCOUNTER — HOSPITAL ENCOUNTER (EMERGENCY)
Facility: HOSPITAL | Age: 85
Discharge: HOME | End: 2020-01-01
Attending: EMERGENCY MEDICINE
Payer: MEDICARE

## 2020-01-01 ENCOUNTER — HOSPITAL ENCOUNTER (INPATIENT)
Facility: HOSPITAL | Age: 85
LOS: 6 days | Discharge: HOSPICE | DRG: 388 | End: 2020-01-01
Attending: EMERGENCY MEDICINE | Admitting: HOSPITALIST
Payer: MEDICARE

## 2020-01-01 ENCOUNTER — APPOINTMENT (OUTPATIENT)
Dept: GENERAL RADIOLOGY | Facility: HOSPITAL | Age: 85
DRG: 193 | End: 2020-01-01
Attending: EMERGENCY MEDICINE
Payer: MEDICARE

## 2020-01-01 ENCOUNTER — APPOINTMENT (OUTPATIENT)
Dept: CT IMAGING | Facility: HOSPITAL | Age: 85
End: 2020-01-01
Attending: EMERGENCY MEDICINE
Payer: MEDICARE

## 2020-01-01 ENCOUNTER — HOSPITAL ENCOUNTER (EMERGENCY)
Facility: HOSPITAL | Age: 85
Discharge: HOME | DRG: 193 | End: 2020-01-01
Attending: EMERGENCY MEDICINE
Payer: MEDICARE

## 2020-01-01 ENCOUNTER — HOSPITAL ENCOUNTER (INPATIENT)
Facility: HOSPITAL | Age: 85
LOS: 1 days | DRG: 388 | End: 2020-01-01
Attending: HOSPITALIST | Admitting: HOSPITALIST
Payer: OTHER MISCELLANEOUS

## 2020-01-01 ENCOUNTER — APPOINTMENT (OUTPATIENT)
Dept: CT IMAGING | Facility: HOSPITAL | Age: 85
DRG: 388 | End: 2020-01-01
Attending: EMERGENCY MEDICINE
Payer: MEDICARE

## 2020-01-01 ENCOUNTER — TELEPHONE (OUTPATIENT)
Dept: CASE MANAGEMENT | Age: 85
End: 2020-01-01

## 2020-01-01 ENCOUNTER — APPOINTMENT (OUTPATIENT)
Dept: PICC SERVICES | Facility: HOSPITAL | Age: 85
DRG: 388 | End: 2020-01-01
Attending: HOSPITALIST
Payer: MEDICARE

## 2020-01-01 ENCOUNTER — APPOINTMENT (OUTPATIENT)
Dept: GENERAL RADIOLOGY | Facility: HOSPITAL | Age: 85
DRG: 193 | End: 2020-01-01
Attending: INTERNAL MEDICINE
Payer: MEDICARE

## 2020-01-01 VITALS
OXYGEN SATURATION: 95 % | DIASTOLIC BLOOD PRESSURE: 90 MMHG | TEMPERATURE: 99 F | HEART RATE: 80 BPM | SYSTOLIC BLOOD PRESSURE: 120 MMHG | HEIGHT: 61 IN | BODY MASS INDEX: 16.98 KG/M2 | WEIGHT: 89.94 LBS | RESPIRATION RATE: 20 BRPM

## 2020-01-01 VITALS
OXYGEN SATURATION: 94 % | DIASTOLIC BLOOD PRESSURE: 65 MMHG | HEART RATE: 67 BPM | RESPIRATION RATE: 18 BRPM | TEMPERATURE: 98 F | SYSTOLIC BLOOD PRESSURE: 153 MMHG

## 2020-01-01 VITALS
HEART RATE: 75 BPM | WEIGHT: 100 LBS | DIASTOLIC BLOOD PRESSURE: 72 MMHG | OXYGEN SATURATION: 95 % | SYSTOLIC BLOOD PRESSURE: 122 MMHG | RESPIRATION RATE: 20 BRPM | TEMPERATURE: 99 F | HEIGHT: 61 IN | BODY MASS INDEX: 18.88 KG/M2

## 2020-01-01 VITALS
TEMPERATURE: 98 F | RESPIRATION RATE: 26 BRPM | OXYGEN SATURATION: 98 % | BODY MASS INDEX: 12 KG/M2 | HEART RATE: 108 BPM | SYSTOLIC BLOOD PRESSURE: 101 MMHG | DIASTOLIC BLOOD PRESSURE: 38 MMHG | WEIGHT: 65.13 LBS

## 2020-01-01 VITALS — OXYGEN SATURATION: 95 %

## 2020-01-01 DIAGNOSIS — J18.9 COMMUNITY ACQUIRED PNEUMONIA OF RIGHT LUNG, UNSPECIFIED PART OF LUNG: Primary | ICD-10-CM

## 2020-01-01 DIAGNOSIS — K56.609 SMALL BOWEL OBSTRUCTION (HCC): Primary | ICD-10-CM

## 2020-01-01 DIAGNOSIS — J84.10 PULMONARY FIBROSIS (HCC): ICD-10-CM

## 2020-01-01 DIAGNOSIS — S09.90XA INJURY OF HEAD, INITIAL ENCOUNTER: Primary | ICD-10-CM

## 2020-01-01 DIAGNOSIS — B34.9 VIRAL SYNDROME: Primary | ICD-10-CM

## 2020-01-01 DIAGNOSIS — E86.0 DEHYDRATION: ICD-10-CM
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
## 2020-01-01 DIAGNOSIS — K56.41 FECAL IMPACTION (HCC): ICD-10-CM

## 2020-01-01 LAB
ADENOVIRUS PCR:: NEGATIVE
ALBUMIN SERPL-MCNC: 1.9 G/DL (ref 3.4–5)
ALBUMIN SERPL-MCNC: 2.1 G/DL (ref 3.4–5)
ALBUMIN/GLOB SERPL: 0.4 {RATIO} (ref 1–2)
ALBUMIN/GLOB SERPL: 0.5 {RATIO} (ref 1–2)
ALP LIVER SERPL-CCNC: 261 U/L (ref 55–142)
ALP LIVER SERPL-CCNC: 309 U/L (ref 55–142)
ALT SERPL-CCNC: 44 U/L (ref 13–56)
ALT SERPL-CCNC: 66 U/L (ref 13–56)
ANION GAP SERPL CALC-SCNC: 4 MMOL/L (ref 0–18)
ANION GAP SERPL CALC-SCNC: 5 MMOL/L (ref 0–18)
ANION GAP SERPL CALC-SCNC: 5 MMOL/L (ref 0–18)
ANION GAP SERPL CALC-SCNC: 6 MMOL/L (ref 0–18)
AST SERPL-CCNC: 11 U/L (ref 15–37)
AST SERPL-CCNC: 43 U/L (ref 15–37)
B PERT DNA SPEC QL NAA+PROBE: NEGATIVE
BASOPHILS # BLD AUTO: 0.02 X10(3) UL (ref 0–0.2)
BASOPHILS # BLD AUTO: 0.02 X10(3) UL (ref 0–0.2)
BASOPHILS # BLD AUTO: 0.03 X10(3) UL (ref 0–0.2)
BASOPHILS # BLD AUTO: 0.04 X10(3) UL (ref 0–0.2)
BASOPHILS NFR BLD AUTO: 0.2 %
BASOPHILS NFR BLD AUTO: 0.3 %
BILIRUB SERPL-MCNC: 0.2 MG/DL (ref 0.1–2)
BILIRUB SERPL-MCNC: 0.6 MG/DL (ref 0.1–2)
BILIRUB UR QL: NEGATIVE
BUN BLD-MCNC: 14 MG/DL (ref 7–18)
BUN BLD-MCNC: 16 MG/DL (ref 7–18)
BUN BLD-MCNC: 18 MG/DL (ref 7–18)
BUN BLD-MCNC: 26 MG/DL (ref 7–18)
BUN/CREAT SERPL: 20.6 (ref 10–20)
BUN/CREAT SERPL: 22.2 (ref 10–20)
BUN/CREAT SERPL: 27.3 (ref 10–20)
BUN/CREAT SERPL: 42.6 (ref 10–20)
C PNEUM DNA SPEC QL NAA+PROBE: NEGATIVE
CALCIUM BLD-MCNC: 8.2 MG/DL (ref 8.5–10.1)
CALCIUM BLD-MCNC: 8.2 MG/DL (ref 8.5–10.1)
CALCIUM BLD-MCNC: 8.8 MG/DL (ref 8.5–10.1)
CALCIUM BLD-MCNC: 8.9 MG/DL (ref 8.5–10.1)
CHLORIDE SERPL-SCNC: 105 MMOL/L (ref 98–112)
CHLORIDE SERPL-SCNC: 109 MMOL/L (ref 98–112)
CHLORIDE SERPL-SCNC: 109 MMOL/L (ref 98–112)
CHLORIDE SERPL-SCNC: 113 MMOL/L (ref 98–112)
CLARITY UR: CLEAR
CO2 SERPL-SCNC: 28 MMOL/L (ref 21–32)
CO2 SERPL-SCNC: 29 MMOL/L (ref 21–32)
COLOR UR: YELLOW
CORONAVIRUS 229E PCR:: NEGATIVE
CORONAVIRUS HKU1 PCR:: NEGATIVE
CORONAVIRUS NL63 PCR:: NEGATIVE
CORONAVIRUS OC43 PCR:: NEGATIVE
CREAT BLD-MCNC: 0.61 MG/DL (ref 0.55–1.02)
CREAT BLD-MCNC: 0.66 MG/DL (ref 0.55–1.02)
CREAT BLD-MCNC: 0.68 MG/DL (ref 0.55–1.02)
CREAT BLD-MCNC: 0.72 MG/DL (ref 0.55–1.02)
DEPRECATED RDW RBC AUTO: 47.5 FL (ref 35.1–46.3)
DEPRECATED RDW RBC AUTO: 49 FL (ref 35.1–46.3)
DEPRECATED RDW RBC AUTO: 49.9 FL (ref 35.1–46.3)
DEPRECATED RDW RBC AUTO: 51.5 FL (ref 35.1–46.3)
EOSINOPHIL # BLD AUTO: 0 X10(3) UL (ref 0–0.7)
EOSINOPHIL # BLD AUTO: 0 X10(3) UL (ref 0–0.7)
EOSINOPHIL # BLD AUTO: 0.01 X10(3) UL (ref 0–0.7)
EOSINOPHIL # BLD AUTO: 0.02 X10(3) UL (ref 0–0.7)
EOSINOPHIL NFR BLD AUTO: 0 %
EOSINOPHIL NFR BLD AUTO: 0 %
EOSINOPHIL NFR BLD AUTO: 0.1 %
EOSINOPHIL NFR BLD AUTO: 0.2 %
ERYTHROCYTE [DISTWIDTH] IN BLOOD BY AUTOMATED COUNT: 14.7 % (ref 11–15)
ERYTHROCYTE [DISTWIDTH] IN BLOOD BY AUTOMATED COUNT: 14.9 % (ref 11–15)
ERYTHROCYTE [DISTWIDTH] IN BLOOD BY AUTOMATED COUNT: 15.1 % (ref 11–15)
ERYTHROCYTE [DISTWIDTH] IN BLOOD BY AUTOMATED COUNT: 15.6 % (ref 11–15)
EST. AVERAGE GLUCOSE BLD GHB EST-MCNC: 126 MG/DL (ref 68–126)
FLUAV RNA SPEC QL NAA+PROBE: NEGATIVE
FLUBV RNA SPEC QL NAA+PROBE: NEGATIVE
GLOBULIN PLAS-MCNC: 4.4 G/DL (ref 2.8–4.4)
GLOBULIN PLAS-MCNC: 4.5 G/DL (ref 2.8–4.4)
GLUCOSE BLD-MCNC: 100 MG/DL (ref 70–99)
GLUCOSE BLD-MCNC: 112 MG/DL (ref 70–99)
GLUCOSE BLD-MCNC: 137 MG/DL (ref 70–99)
GLUCOSE BLD-MCNC: 181 MG/DL (ref 70–99)
GLUCOSE UR-MCNC: NEGATIVE MG/DL
HBA1C MFR BLD HPLC: 6 % (ref ?–5.7)
HCT VFR BLD AUTO: 32 % (ref 35–48)
HCT VFR BLD AUTO: 32.5 % (ref 35–48)
HCT VFR BLD AUTO: 33.2 % (ref 35–48)
HCT VFR BLD AUTO: 33.3 % (ref 35–48)
HGB BLD-MCNC: 10.1 G/DL (ref 12–16)
HGB BLD-MCNC: 10.3 G/DL (ref 12–16)
HGB BLD-MCNC: 10.5 G/DL (ref 12–16)
HGB BLD-MCNC: 10.8 G/DL (ref 12–16)
IMM GRANULOCYTES # BLD AUTO: 0.05 X10(3) UL (ref 0–1)
IMM GRANULOCYTES # BLD AUTO: 0.05 X10(3) UL (ref 0–1)
IMM GRANULOCYTES # BLD AUTO: 0.09 X10(3) UL (ref 0–1)
IMM GRANULOCYTES # BLD AUTO: 0.19 X10(3) UL (ref 0–1)
IMM GRANULOCYTES NFR BLD: 0.5 %
IMM GRANULOCYTES NFR BLD: 0.5 %
IMM GRANULOCYTES NFR BLD: 0.7 %
IMM GRANULOCYTES NFR BLD: 1.1 %
KETONES UR-MCNC: NEGATIVE MG/DL
LACTATE SERPL-SCNC: 0.6 MMOL/L (ref 0.4–2)
LEUKOCYTE ESTERASE UR QL STRIP.AUTO: NEGATIVE
LYMPHOCYTES # BLD AUTO: 0.41 X10(3) UL (ref 1–4)
LYMPHOCYTES # BLD AUTO: 0.58 X10(3) UL (ref 1–4)
LYMPHOCYTES # BLD AUTO: 0.6 X10(3) UL (ref 1–4)
LYMPHOCYTES # BLD AUTO: 1.02 X10(3) UL (ref 1–4)
LYMPHOCYTES NFR BLD AUTO: 3.3 %
LYMPHOCYTES NFR BLD AUTO: 3.9 %
LYMPHOCYTES NFR BLD AUTO: 4.8 %
LYMPHOCYTES NFR BLD AUTO: 9.3 %
M PROTEIN MFR SERPL ELPH: 6.4 G/DL (ref 6.4–8.2)
M PROTEIN MFR SERPL ELPH: 6.5 G/DL (ref 6.4–8.2)
MCH RBC QN AUTO: 28.1 PG (ref 26–34)
MCH RBC QN AUTO: 28.1 PG (ref 26–34)
MCH RBC QN AUTO: 28.3 PG (ref 26–34)
MCH RBC QN AUTO: 28.6 PG (ref 26–34)
MCHC RBC AUTO-ENTMCNC: 31.5 G/DL (ref 31–37)
MCHC RBC AUTO-ENTMCNC: 31.6 G/DL (ref 31–37)
MCHC RBC AUTO-ENTMCNC: 31.7 G/DL (ref 31–37)
MCHC RBC AUTO-ENTMCNC: 32.5 G/DL (ref 31–37)
MCV RBC AUTO: 88.1 FL (ref 80–100)
MCV RBC AUTO: 88.8 FL (ref 80–100)
MCV RBC AUTO: 89.1 FL (ref 80–100)
MCV RBC AUTO: 89.8 FL (ref 80–100)
METAPNEUMOVIRUS PCR:: NEGATIVE
MONOCYTES # BLD AUTO: 0.12 X10(3) UL (ref 0.1–1)
MONOCYTES # BLD AUTO: 0.59 X10(3) UL (ref 0.1–1)
MONOCYTES # BLD AUTO: 0.65 X10(3) UL (ref 0.1–1)
MONOCYTES # BLD AUTO: 0.67 X10(3) UL (ref 0.1–1)
MONOCYTES NFR BLD AUTO: 1.1 %
MONOCYTES NFR BLD AUTO: 3.3 %
MONOCYTES NFR BLD AUTO: 5.2 %
MONOCYTES NFR BLD AUTO: 6.1 %
MYCOPLASMA PNEUMONIA PCR:: NEGATIVE
NEUTROPHILS # BLD AUTO: 11.22 X10 (3) UL (ref 1.5–7.7)
NEUTROPHILS # BLD AUTO: 11.22 X10(3) UL (ref 1.5–7.7)
NEUTROPHILS # BLD AUTO: 16.28 X10 (3) UL (ref 1.5–7.7)
NEUTROPHILS # BLD AUTO: 16.28 X10(3) UL (ref 1.5–7.7)
NEUTROPHILS # BLD AUTO: 9.21 X10 (3) UL (ref 1.5–7.7)
NEUTROPHILS # BLD AUTO: 9.21 X10(3) UL (ref 1.5–7.7)
NEUTROPHILS # BLD AUTO: 9.99 X10 (3) UL (ref 1.5–7.7)
NEUTROPHILS # BLD AUTO: 9.99 X10(3) UL (ref 1.5–7.7)
NEUTROPHILS NFR BLD AUTO: 83.7 %
NEUTROPHILS NFR BLD AUTO: 88.9 %
NEUTROPHILS NFR BLD AUTO: 92.1 %
NEUTROPHILS NFR BLD AUTO: 94.3 %
NITRITE UR QL STRIP.AUTO: NEGATIVE
OSMOLALITY SERPL CALC.SUM OF ELEC: 287 MOSM/KG (ref 275–295)
OSMOLALITY SERPL CALC.SUM OF ELEC: 299 MOSM/KG (ref 275–295)
OSMOLALITY SERPL CALC.SUM OF ELEC: 301 MOSM/KG (ref 275–295)
OSMOLALITY SERPL CALC.SUM OF ELEC: 306 MOSM/KG (ref 275–295)
PARAINFLUENZA 1 PCR:: NEGATIVE
PARAINFLUENZA 2 PCR:: NEGATIVE
PARAINFLUENZA 3 PCR:: NEGATIVE
PARAINFLUENZA 4 PCR:: NEGATIVE
PH UR: 5 [PH] (ref 5–8)
PLATELET # BLD AUTO: 234 10(3)UL (ref 150–450)
PLATELET # BLD AUTO: 259 10(3)UL (ref 150–450)
PLATELET # BLD AUTO: 292 10(3)UL (ref 150–450)
PLATELET # BLD AUTO: 381 10(3)UL (ref 150–450)
POTASSIUM SERPL-SCNC: 3.3 MMOL/L (ref 3.5–5.1)
POTASSIUM SERPL-SCNC: 3.5 MMOL/L (ref 3.5–5.1)
POTASSIUM SERPL-SCNC: 3.9 MMOL/L (ref 3.5–5.1)
POTASSIUM SERPL-SCNC: 4.7 MMOL/L (ref 3.5–5.1)
POTASSIUM SERPL-SCNC: 4.8 MMOL/L (ref 3.5–5.1)
PROCALCITONIN SERPL-MCNC: 0.1 NG/ML
PROT UR-MCNC: NEGATIVE MG/DL
RBC # BLD AUTO: 3.59 X10(6)UL (ref 3.8–5.3)
RBC # BLD AUTO: 3.66 X10(6)UL (ref 3.8–5.3)
RBC # BLD AUTO: 3.71 X10(6)UL (ref 3.8–5.3)
RBC # BLD AUTO: 3.77 X10(6)UL (ref 3.8–5.3)
RBC #/AREA URNS AUTO: <1 /HPF
RHINOVIRUS/ENTERO PCR:: NEGATIVE
RSV RNA SPEC QL NAA+PROBE: NEGATIVE
SODIUM SERPL-SCNC: 138 MMOL/L (ref 136–145)
SODIUM SERPL-SCNC: 142 MMOL/L (ref 136–145)
SODIUM SERPL-SCNC: 144 MMOL/L (ref 136–145)
SODIUM SERPL-SCNC: 145 MMOL/L (ref 136–145)
SP GR UR STRIP: 1.02 (ref 1–1.03)
TSI SER-ACNC: 0.95 MIU/ML (ref 0.36–3.74)
UROBILINOGEN UR STRIP-ACNC: 2
WBC # BLD AUTO: 10.6 X10(3) UL (ref 4–11)
WBC # BLD AUTO: 11 X10(3) UL (ref 4–11)
WBC # BLD AUTO: 12.6 X10(3) UL (ref 4–11)
WBC # BLD AUTO: 17.7 X10(3) UL (ref 4–11)
WBC #/AREA URNS AUTO: 1 /HPF

## 2020-01-01 PROCEDURE — 71045 X-RAY EXAM CHEST 1 VIEW: CPT | Performed by: EMERGENCY MEDICINE

## 2020-01-01 PROCEDURE — 99233 SBSQ HOSP IP/OBS HIGH 50: CPT | Performed by: HOSPITALIST

## 2020-01-01 PROCEDURE — 94640 AIRWAY INHALATION TREATMENT: CPT

## 2020-01-01 PROCEDURE — 74019 RADEX ABDOMEN 2 VIEWS: CPT | Performed by: HOSPITALIST

## 2020-01-01 PROCEDURE — 71045 X-RAY EXAM CHEST 1 VIEW: CPT | Performed by: HOSPITALIST

## 2020-01-01 PROCEDURE — 87040 BLOOD CULTURE FOR BACTERIA: CPT | Performed by: EMERGENCY MEDICINE

## 2020-01-01 PROCEDURE — 96361 HYDRATE IV INFUSION ADD-ON: CPT

## 2020-01-01 PROCEDURE — 74176 CT ABD & PELVIS W/O CONTRAST: CPT | Performed by: EMERGENCY MEDICINE

## 2020-01-01 PROCEDURE — 80053 COMPREHEN METABOLIC PANEL: CPT | Performed by: INTERNAL MEDICINE

## 2020-01-01 PROCEDURE — 80048 BASIC METABOLIC PNL TOTAL CA: CPT | Performed by: EMERGENCY MEDICINE

## 2020-01-01 PROCEDURE — 96367 TX/PROPH/DG ADDL SEQ IV INF: CPT

## 2020-01-01 PROCEDURE — 85025 COMPLETE CBC W/AUTO DIFF WBC: CPT | Performed by: INTERNAL MEDICINE

## 2020-01-01 PROCEDURE — 99285 EMERGENCY DEPT VISIT HI MDM: CPT

## 2020-01-01 PROCEDURE — 99223 1ST HOSP IP/OBS HIGH 75: CPT | Performed by: HOSPITALIST

## 2020-01-01 PROCEDURE — 99284 EMERGENCY DEPT VISIT MOD MDM: CPT

## 2020-01-01 PROCEDURE — B5181ZA FLUOROSCOPY OF SUPERIOR VENA CAVA USING LOW OSMOLAR CONTRAST, GUIDANCE: ICD-10-PCS | Performed by: HOSPITALIST

## 2020-01-01 PROCEDURE — 83036 HEMOGLOBIN GLYCOSYLATED A1C: CPT | Performed by: INTERNAL MEDICINE

## 2020-01-01 PROCEDURE — 99231 SBSQ HOSP IP/OBS SF/LOW 25: CPT | Performed by: NURSE PRACTITIONER

## 2020-01-01 PROCEDURE — 93005 ELECTROCARDIOGRAM TRACING: CPT

## 2020-01-01 PROCEDURE — 02HV33Z INSERTION OF INFUSION DEVICE INTO SUPERIOR VENA CAVA, PERCUTANEOUS APPROACH: ICD-10-PCS | Performed by: HOSPITALIST

## 2020-01-01 PROCEDURE — 87486 CHLMYD PNEUM DNA AMP PROBE: CPT | Performed by: EMERGENCY MEDICINE

## 2020-01-01 PROCEDURE — 71260 CT THORAX DX C+: CPT | Performed by: INTERNAL MEDICINE

## 2020-01-01 PROCEDURE — 85025 COMPLETE CBC W/AUTO DIFF WBC: CPT | Performed by: EMERGENCY MEDICINE

## 2020-01-01 PROCEDURE — 93010 ELECTROCARDIOGRAM REPORT: CPT | Performed by: EMERGENCY MEDICINE

## 2020-01-01 PROCEDURE — 71045 X-RAY EXAM CHEST 1 VIEW: CPT | Performed by: INTERNAL MEDICINE

## 2020-01-01 PROCEDURE — 87798 DETECT AGENT NOS DNA AMP: CPT | Performed by: EMERGENCY MEDICINE

## 2020-01-01 PROCEDURE — 81001 URINALYSIS AUTO W/SCOPE: CPT | Performed by: EMERGENCY MEDICINE

## 2020-01-01 PROCEDURE — 74019 RADEX ABDOMEN 2 VIEWS: CPT | Performed by: SURGERY

## 2020-01-01 PROCEDURE — 87633 RESP VIRUS 12-25 TARGETS: CPT | Performed by: EMERGENCY MEDICINE

## 2020-01-01 PROCEDURE — 70450 CT HEAD/BRAIN W/O DYE: CPT | Performed by: EMERGENCY MEDICINE

## 2020-01-01 PROCEDURE — 84443 ASSAY THYROID STIM HORMONE: CPT | Performed by: INTERNAL MEDICINE

## 2020-01-01 PROCEDURE — 97530 THERAPEUTIC ACTIVITIES: CPT

## 2020-01-01 PROCEDURE — 97535 SELF CARE MNGMENT TRAINING: CPT

## 2020-01-01 PROCEDURE — 36415 COLL VENOUS BLD VENIPUNCTURE: CPT

## 2020-01-01 PROCEDURE — 83605 ASSAY OF LACTIC ACID: CPT | Performed by: EMERGENCY MEDICINE

## 2020-01-01 PROCEDURE — 84132 ASSAY OF SERUM POTASSIUM: CPT | Performed by: INTERNAL MEDICINE

## 2020-01-01 PROCEDURE — 97162 PT EVAL MOD COMPLEX 30 MIN: CPT

## 2020-01-01 PROCEDURE — 87581 M.PNEUMON DNA AMP PROBE: CPT | Performed by: EMERGENCY MEDICINE

## 2020-01-01 PROCEDURE — 99221 1ST HOSP IP/OBS SF/LOW 40: CPT | Performed by: NURSE PRACTITIONER

## 2020-01-01 PROCEDURE — 84145 PROCALCITONIN (PCT): CPT | Performed by: EMERGENCY MEDICINE

## 2020-01-01 PROCEDURE — 99232 SBSQ HOSP IP/OBS MODERATE 35: CPT | Performed by: NURSE PRACTITIONER

## 2020-01-01 PROCEDURE — 97165 OT EVAL LOW COMPLEX 30 MIN: CPT

## 2020-01-01 PROCEDURE — 96365 THER/PROPH/DIAG IV INF INIT: CPT

## 2020-01-01 RX ORDER — AZITHROMYCIN 250 MG/1
250 TABLET, FILM COATED ORAL DAILY
Qty: 6 TABLET | Refills: 0 | Status: SHIPPED | OUTPATIENT
Start: 2020-01-01 | End: 2020-01-01

## 2020-01-01 RX ORDER — ATROPINE SULFATE 10 MG/ML
2 SOLUTION/ DROPS OPHTHALMIC EVERY 2 HOUR PRN
Status: DISCONTINUED | OUTPATIENT
Start: 2020-01-01 | End: 2020-11-17

## 2020-01-01 RX ORDER — FUROSEMIDE 10 MG/ML
40 INJECTION INTRAMUSCULAR; INTRAVENOUS ONCE
Status: COMPLETED | OUTPATIENT
Start: 2020-01-01 | End: 2020-01-01

## 2020-01-01 RX ORDER — DEXTROSE, SODIUM CHLORIDE, AND POTASSIUM CHLORIDE 5; .45; .15 G/100ML; G/100ML; G/100ML
INJECTION INTRAVENOUS CONTINUOUS
Status: DISCONTINUED | OUTPATIENT
Start: 2020-01-01 | End: 2020-01-01

## 2020-01-01 RX ORDER — GLYCOPYRROLATE 0.2 MG/ML
0.4 INJECTION, SOLUTION INTRAMUSCULAR; INTRAVENOUS
Status: DISCONTINUED | OUTPATIENT
Start: 2020-01-01 | End: 2020-11-17

## 2020-01-01 RX ORDER — DEXTROSE MONOHYDRATE 25 G/50ML
INJECTION, SOLUTION INTRAVENOUS
Status: COMPLETED
Start: 2020-01-01 | End: 2020-01-01

## 2020-01-01 RX ORDER — POTASSIUM CHLORIDE 1.5 G/1.77G
40 POWDER, FOR SOLUTION ORAL EVERY 4 HOURS
Status: COMPLETED | OUTPATIENT
Start: 2020-01-01 | End: 2020-01-01

## 2020-01-01 RX ORDER — SODIUM CHLORIDE 0.9 % (FLUSH) 0.9 %
3 SYRINGE (ML) INJECTION AS NEEDED
Status: DISCONTINUED | OUTPATIENT
Start: 2020-01-01 | End: 2020-01-01

## 2020-01-01 RX ORDER — MORPHINE SULFATE 2 MG/ML
1 INJECTION, SOLUTION INTRAMUSCULAR; INTRAVENOUS
Status: DISCONTINUED | OUTPATIENT
Start: 2020-01-01 | End: 2020-11-17

## 2020-01-01 RX ORDER — CHOLECALCIFEROL (VITAMIN D3) 125 MCG
1000 CAPSULE ORAL DAILY
Status: DISCONTINUED | OUTPATIENT
Start: 2020-01-01 | End: 2020-01-01

## 2020-01-01 RX ORDER — HALOPERIDOL 5 MG/ML
2 INJECTION INTRAMUSCULAR
Status: DISCONTINUED | OUTPATIENT
Start: 2020-01-01 | End: 2020-11-17

## 2020-01-01 RX ORDER — HEPARIN SODIUM 5000 [USP'U]/ML
5000 INJECTION, SOLUTION INTRAVENOUS; SUBCUTANEOUS EVERY 12 HOURS SCHEDULED
Status: DISCONTINUED | OUTPATIENT
Start: 2020-01-01 | End: 2020-01-01

## 2020-01-01 RX ORDER — AZITHROMYCIN 250 MG/1
500 TABLET, FILM COATED ORAL DAILY
Status: COMPLETED | OUTPATIENT
Start: 2020-01-01 | End: 2020-01-01

## 2020-01-01 RX ORDER — MORPHINE SULFATE 2 MG/ML
2 INJECTION, SOLUTION INTRAMUSCULAR; INTRAVENOUS EVERY 4 HOURS PRN
Status: DISCONTINUED | OUTPATIENT
Start: 2020-01-01 | End: 2020-01-01

## 2020-01-01 RX ORDER — ONDANSETRON 2 MG/ML
4 INJECTION INTRAMUSCULAR; INTRAVENOUS EVERY 6 HOURS PRN
Status: DISCONTINUED | OUTPATIENT
Start: 2020-01-01 | End: 2020-01-01

## 2020-01-01 RX ORDER — AZITHROMYCIN 250 MG/1
250 TABLET, FILM COATED ORAL DAILY
Status: DISCONTINUED | OUTPATIENT
Start: 2020-01-01 | End: 2020-01-01

## 2020-01-01 RX ORDER — BISACODYL 10 MG
10 SUPPOSITORY, RECTAL RECTAL
Status: DISCONTINUED | OUTPATIENT
Start: 2020-01-01 | End: 2020-11-17

## 2020-01-01 RX ORDER — LEVOTHYROXINE SODIUM 0.05 MG/1
50 TABLET ORAL
Status: DISCONTINUED | OUTPATIENT
Start: 2020-01-01 | End: 2020-01-01

## 2020-01-01 RX ORDER — SCOLOPAMINE TRANSDERMAL SYSTEM 1 MG/1
1 PATCH, EXTENDED RELEASE TRANSDERMAL
Status: DISCONTINUED | OUTPATIENT
Start: 2020-01-01 | End: 2020-11-17

## 2020-01-01 RX ORDER — AZITHROMYCIN 250 MG/1
500 TABLET, FILM COATED ORAL DAILY
Status: DISCONTINUED | OUTPATIENT
Start: 2020-01-01 | End: 2020-01-01

## 2020-01-01 RX ORDER — LEVOTHYROXINE SODIUM 0.05 MG/1
50 TABLET ORAL
Status: ON HOLD | COMMUNITY
End: 2020-01-01

## 2020-01-01 RX ORDER — MORPHINE SULFATE 2 MG/ML
1 INJECTION, SOLUTION INTRAMUSCULAR; INTRAVENOUS ONCE
Status: COMPLETED | OUTPATIENT
Start: 2020-01-01 | End: 2020-01-01

## 2020-01-01 RX ORDER — FUROSEMIDE 10 MG/ML
20 INJECTION INTRAMUSCULAR; INTRAVENOUS ONCE
Status: COMPLETED | OUTPATIENT
Start: 2020-01-01 | End: 2020-01-01

## 2020-01-01 RX ORDER — METHYLPREDNISOLONE SODIUM SUCCINATE 40 MG/ML
40 INJECTION, POWDER, LYOPHILIZED, FOR SOLUTION INTRAMUSCULAR; INTRAVENOUS EVERY 12 HOURS
Status: DISCONTINUED | OUTPATIENT
Start: 2020-01-01 | End: 2020-01-01

## 2020-01-01 RX ORDER — SODIUM CHLORIDE 9 MG/ML
INJECTION, SOLUTION INTRAVENOUS CONTINUOUS
Status: DISCONTINUED | OUTPATIENT
Start: 2020-01-01 | End: 2020-01-01

## 2020-01-01 RX ORDER — SODIUM CHLORIDE 9 MG/ML
INJECTION, SOLUTION INTRAVENOUS CONTINUOUS
Status: ACTIVE | OUTPATIENT
Start: 2020-01-01 | End: 2020-01-01

## 2020-01-01 RX ORDER — MELATONIN
1000 DAILY
Status: ON HOLD | COMMUNITY
End: 2020-01-01

## 2020-01-01 RX ORDER — SERTRALINE HYDROCHLORIDE 25 MG/1
25 TABLET, FILM COATED ORAL DAILY
Status: ON HOLD | COMMUNITY
End: 2020-01-01

## 2020-01-01 RX ORDER — FUROSEMIDE 40 MG/1
40 TABLET ORAL EVERY 8 HOURS PRN
Status: DISCONTINUED | OUTPATIENT
Start: 2020-01-01 | End: 2020-11-17

## 2020-01-01 RX ORDER — HALOPERIDOL 5 MG/ML
1 INJECTION INTRAMUSCULAR
Status: DISCONTINUED | OUTPATIENT
Start: 2020-01-01 | End: 2020-11-17

## 2020-01-01 RX ORDER — ACETAMINOPHEN 650 MG/1
650 SUPPOSITORY RECTAL EVERY 6 HOURS PRN
Status: DISCONTINUED | OUTPATIENT
Start: 2020-01-01 | End: 2020-11-17

## 2020-01-01 RX ORDER — PANTOPRAZOLE SODIUM 20 MG/1
20 TABLET, DELAYED RELEASE ORAL
Status: DISCONTINUED | OUTPATIENT
Start: 2020-01-01 | End: 2020-01-01

## 2020-01-01 RX ORDER — PREDNISONE 20 MG/1
20 TABLET ORAL DAILY
Qty: 15 TABLET | Refills: 0 | Status: SHIPPED | OUTPATIENT
Start: 2020-01-01 | End: 2020-01-01

## 2020-01-01 RX ORDER — DEXTROSE MONOHYDRATE 25 G/50ML
50 INJECTION, SOLUTION INTRAVENOUS ONCE
Status: COMPLETED | OUTPATIENT
Start: 2020-01-01 | End: 2020-01-01

## 2020-01-01 RX ORDER — PANTOPRAZOLE SODIUM 40 MG/1
40 TABLET, DELAYED RELEASE ORAL
Status: DISCONTINUED | OUTPATIENT
Start: 2020-01-01 | End: 2020-01-01

## 2020-01-01 RX ORDER — LEVOFLOXACIN 5 MG/ML
500 INJECTION, SOLUTION INTRAVENOUS
Status: DISCONTINUED | OUTPATIENT
Start: 2020-11-18 | End: 2020-01-01

## 2020-01-01 RX ORDER — LORAZEPAM 2 MG/ML
2 INJECTION INTRAMUSCULAR EVERY 4 HOURS PRN
Status: DISCONTINUED | OUTPATIENT
Start: 2020-01-01 | End: 2020-11-17

## 2020-01-01 RX ORDER — LEVOFLOXACIN 5 MG/ML
750 INJECTION, SOLUTION INTRAVENOUS ONCE
Status: DISCONTINUED | OUTPATIENT
Start: 2020-01-01 | End: 2020-01-01

## 2020-01-01 RX ORDER — ACETAMINOPHEN 160 MG/5ML
650 SOLUTION ORAL EVERY 6 HOURS PRN
Status: DISCONTINUED | OUTPATIENT
Start: 2020-01-01 | End: 2020-11-17

## 2020-01-01 RX ORDER — SODIUM CHLORIDE 9 MG/ML
INJECTION, SOLUTION INTRAVENOUS ONCE
Status: COMPLETED | OUTPATIENT
Start: 2020-01-01 | End: 2020-01-01

## 2020-01-01 RX ORDER — MAGNESIUM SULFATE HEPTAHYDRATE 40 MG/ML
2 INJECTION, SOLUTION INTRAVENOUS ONCE
Status: COMPLETED | OUTPATIENT
Start: 2020-01-01 | End: 2020-01-01

## 2020-01-01 RX ORDER — SODIUM CHLORIDE 0.9 % (FLUSH) 0.9 %
10 SYRINGE (ML) INJECTION AS NEEDED
Status: DISCONTINUED | OUTPATIENT
Start: 2020-01-01 | End: 2020-11-17

## 2020-01-01 RX ORDER — LORAZEPAM 2 MG/ML
1 INJECTION INTRAMUSCULAR EVERY 4 HOURS PRN
Status: DISCONTINUED | OUTPATIENT
Start: 2020-01-01 | End: 2020-11-17

## 2020-01-01 RX ORDER — IPRATROPIUM BROMIDE AND ALBUTEROL SULFATE 2.5; .5 MG/3ML; MG/3ML
3 SOLUTION RESPIRATORY (INHALATION) EVERY 4 HOURS PRN
Status: DISCONTINUED | OUTPATIENT
Start: 2020-01-01 | End: 2020-01-01

## 2020-01-01 RX ORDER — ACETAMINOPHEN 325 MG/1
650 TABLET ORAL EVERY 6 HOURS PRN
Status: ON HOLD | COMMUNITY
End: 2020-01-01

## 2020-01-01 RX ORDER — SERTRALINE HYDROCHLORIDE 25 MG/1
25 TABLET, FILM COATED ORAL DAILY
Status: DISCONTINUED | OUTPATIENT
Start: 2020-01-01 | End: 2020-01-01

## 2020-01-01 RX ORDER — AZITHROMYCIN 250 MG/1
TABLET, FILM COATED ORAL
Qty: 4 TABLET | Refills: 0 | Status: SHIPPED | OUTPATIENT
Start: 2020-01-01 | End: 2020-01-01

## 2020-01-01 RX ORDER — LEVOFLOXACIN 5 MG/ML
250 INJECTION, SOLUTION INTRAVENOUS EVERY 24 HOURS
Status: DISCONTINUED | OUTPATIENT
Start: 2020-01-01 | End: 2020-01-01

## 2020-01-01 RX ORDER — LORAZEPAM 2 MG/ML
0.5 INJECTION INTRAMUSCULAR EVERY 4 HOURS PRN
Status: DISCONTINUED | OUTPATIENT
Start: 2020-01-01 | End: 2020-11-17

## 2020-01-01 RX ORDER — POTASSIUM CHLORIDE 14.9 MG/ML
20 INJECTION INTRAVENOUS ONCE
Status: COMPLETED | OUTPATIENT
Start: 2020-01-01 | End: 2020-01-01

## 2020-01-01 RX ORDER — ONDANSETRON 4 MG/1
4 TABLET, ORALLY DISINTEGRATING ORAL EVERY 6 HOURS PRN
Status: DISCONTINUED | OUTPATIENT
Start: 2020-01-01 | End: 2020-11-17

## 2020-01-01 RX ORDER — ONDANSETRON 2 MG/ML
4 INJECTION INTRAMUSCULAR; INTRAVENOUS EVERY 6 HOURS PRN
Status: DISCONTINUED | OUTPATIENT
Start: 2020-01-01 | End: 2020-11-17

## 2020-01-01 RX ORDER — IPRATROPIUM BROMIDE AND ALBUTEROL SULFATE 2.5; .5 MG/3ML; MG/3ML
3 SOLUTION RESPIRATORY (INHALATION) ONCE
Status: COMPLETED | OUTPATIENT
Start: 2020-01-01 | End: 2020-01-01

## 2020-01-01 RX ORDER — 0.9 % SODIUM CHLORIDE 0.9 %
3 VIAL (ML) INJECTION AS NEEDED
Status: DISCONTINUED | OUTPATIENT
Start: 2020-01-01 | End: 2020-01-01

## 2020-01-01 RX ORDER — MORPHINE SULFATE IN 0.9 % NACL 1 MG/ML
1 PLASTIC BAG, INJECTION (ML) INTRAVENOUS CONTINUOUS PRN
Status: DISCONTINUED | OUTPATIENT
Start: 2020-01-01 | End: 2020-11-17

## 2020-01-01 RX ORDER — FUROSEMIDE 10 MG/ML
40 INJECTION INTRAMUSCULAR; INTRAVENOUS EVERY 8 HOURS PRN
Status: DISCONTINUED | OUTPATIENT
Start: 2020-01-01 | End: 2020-11-17

## 2020-01-01 RX ORDER — DEXTROSE AND SODIUM CHLORIDE 5; .45 G/100ML; G/100ML
INJECTION, SOLUTION INTRAVENOUS CONTINUOUS
Status: DISCONTINUED | OUTPATIENT
Start: 2020-01-01 | End: 2020-01-01

## 2020-01-01 RX ORDER — ONDANSETRON 2 MG/ML
4 INJECTION INTRAMUSCULAR; INTRAVENOUS ONCE
Status: DISCONTINUED | OUTPATIENT
Start: 2020-01-01 | End: 2020-01-01

## 2020-01-02 NOTE — ED PROVIDER NOTES
Patient Seen in: [facility] AND [facility] Emergency Department    History   Patient presents with:  Fever      HPI    Patient presents from her care facility for low-grade fevers coughing and more fatigue for the past 3 days.   Patient unable to give history due Left eye exhibits no discharge. Neck: No tracheal deviation present. Cardiovascular: Normal rate, regular rhythm and intact distal pulses. Pulmonary/Chest: Effort normal and breath sounds normal. No stridor. No respiratory distress.  She has no wheeze DRAW GOLD   BLOOD CULTURE   BLOOD CULTURE         Imaging Results Available and Reviewed while in ED:    Preliminary Radiology Report  [facility], [facility]  [phone] - Phone    [id number]    NAME: Kirsten Woo OF EXAM: 01/01/2 pertinent discharge summaries, testing, and procedures and reviewed those reports. Complicating Factors: The patient already has does not have a problem list on file. to contribute to the complexity of this ED evaluation.     ED Course: Patient presents

## 2020-01-02 NOTE — ED INITIAL ASSESSMENT (HPI)
PT BROUGHT IN BY [facility] FROM [facility] FOR COMPLAINTS OF FEVER, LETHARGY X 3 DAYS, \"NOT HERSELF\", NOT EATING WELL.   PER REPORT BY EMS OXYGEN ON SCENE WAS AT 87 %, PT STARTED ON OXYGEN AT 3LPM BY NASAL CANNULA , OXYGEN ON SCENE AT 87 %,

## 2020-01-02 NOTE — ED NOTES
[facility] IN, PT WILL BE TRANSPORTED BACK TO [facility], WITH DISCHARGE INSTRUCTION GIVEN [facility] TO BE GIVEN TO NURSE PEREZ IN [facility].

## 2020-01-04 ENCOUNTER — TELEPHONE (OUTPATIENT)
Dept: SCHEDULING | Age: 85
End: 2020-01-04

## 2020-01-04 PROBLEM — E86.0 DEHYDRATION: Status: ACTIVE | Noted: 2020-01-01

## 2020-01-04 PROBLEM — J18.9 COMMUNITY ACQUIRED PNEUMONIA OF RIGHT LUNG, UNSPECIFIED PART OF LUNG: Status: ACTIVE | Noted: 2020-01-01

## 2020-01-04 PROBLEM — J18.9 COMMUNITY ACQUIRED PNEUMONIA OF RIGHT LUNG: Status: ACTIVE | Noted: 2020-01-01

## 2020-01-04 NOTE — CONSULTS
[facility]    PATIENT'S NAME: Heather Gasca   ATTENDING PHYSICIAN: Celestina Benton. Walter Loja MD   CONSULTING PHYSICIAN: Magdalena Mclean MD   PATIENT ACCOUNT#:   [id number]    LOCATION:  [address]  MEDICAL RECORD #:   [id number]       DATE OF BIRTH:  1 RESPIRATORY: See the Present Illness. GASTROINTESTINAL: Poor appetite. MUSCULOSKELETAL: Patient had a right hip fracture in March of this year.     PHYSICAL EXAMINATION:    VITAL SIGNS:  Temperature 98.1, pulse 97, respirations 18, blood pressure 129/5

## 2020-01-04 NOTE — ED NOTES
Orders for admission, patient is aware of plan and ready to go upstairs. Any questions, please call ED ERIC Viera  at extension [phone]. Pt is a/o x 1 at baseline. Pt from [address] [place] [address]. Pt is on 3L O2.

## 2020-01-04 NOTE — ED INITIAL ASSESSMENT (HPI)
Triage: pt arrived via EMS for shortness of breath, fever 100.6, and low O2 stats at nursing home,  [facility], pt at baseline Ax0x1, per nursing home, was seen here 2 days ago for same + cough, congestion

## 2020-01-04 NOTE — ED PROVIDER NOTES
Patient Seen in: [facility] AND [facility] Emergency Department    History   Patient presents with:  Fever  Breathing Problem    Stated Complaint: shortness of breath    HPI    Patient is here for continued symptoms.   She was here 3 days ago had extensive work-u MG Oral Tab,  1 TABLET TWICE DAILY ON EMPTY STOMACH         Review of Systems    Cardiovascular: no chest pain  Respiratory: shortness of breath  Gastrointestinal: no abdominal pain      Positive for stated complaint: shortness of breath  Other systems are as active and not eating as much. She is likely not safe to go back to facility and likely needs inpatient treatment. The granddaughter is comfortable with this. Certainly prognosis guarded with her age and underlying illnesses.     ED Course     Labs Re diagnosis)  Dehydration    Disposition:  There is no disposition on file for this visit. Follow-up:  No follow-up provider specified.     Medications Prescribed:  Current Discharge Medication List

## 2020-01-05 PROBLEM — J44.1 COPD EXACERBATION (HCC): Status: ACTIVE | Noted: 2020-01-01

## 2020-01-05 NOTE — PLAN OF CARE
Problem: Patient/Family Goals  Goal: Patient/Family Long Term Goal  Description  Patient's Long Term Goal: to [facility] to [facility]    Interventions:  - follow MD orders  - See additional Care Plan goals for specific interventions  1/5/2020 0906 by [facility] Anticipate increased pain with activity and pre-medicate as appropriate  Outcome: Progressing     Problem: RISK FOR INFECTION - ADULT  Goal: Absence of fever/infection during anticipated neutropenic period  Description  INTERVENTIONS  - Monitor WBC  - Admi

## 2020-01-05 NOTE — PLAN OF CARE
Problem: Patient/Family Goals  Goal: Patient/Family Long Term Goal  Description  Patient's Long Term Goal: to [facility] to [place] [facility]     Interventions:  - follow MD orders  -o2 as needed  -antibiotics as ordered  - See additional Care Plan goals for speci

## 2020-01-05 NOTE — PROGRESS NOTES
[place] [facility] - [facility]    Progress Note    Gloria Britton Patient Status:  Inpatient    3/9/1926 MRN [id number]   Location [facility] 5SW/SE Attending Matthew Rosenbaum MD   Hosp Day # 1 PCP Israel Benavidez MD       Subjective:    Cristhian Luna pulmonary embolus. 5. Multiple chronic vertebral body compression fractures some of which have kyphoplasty changes. 6. Evaluation limited by respiratory related motion artifact.     Dictated by (CST): Vidya Leigh MD on 1/04/2020 at 21:47     Approved by

## 2020-01-05 NOTE — PROGRESS NOTES
[facility] Note: Route Optimization for Azithromycin [facility])    Patient is currently on Azithromycin (ZITHROMAX) 500 mg IV every 24 hours.    The patient meets the criteria to convert to the oral equivalent as established by the IV to Oral conversion

## 2020-01-05 NOTE — PLAN OF CARE
Confused, unable to provide history, Dr. Anu Mtz came to see pt. Ordered d5/.45 @40cc/hr.  Labs in the am.   Problem: Patient/Family Goals  Goal: Patient/Family Long Term Goal  Description  Patient's Long Term Goal: prevent future infections and hospita appropriate  Outcome: Progressing     Problem: RISK FOR INFECTION - ADULT  Goal: Absence of fever/infection during anticipated neutropenic period  Description  INTERVENTIONS  - Monitor WBC  - Administer growth factors as ordered  - Implement neutropenic gu

## 2020-01-06 NOTE — PROGRESS NOTES
[facility]    Progress Note    Maxine Solo Patient Status:  Inpatient    3/9/1926 MRN [id number]   Location [facility] 5SW/SE Attending Aniyah Jimenez MD   Hosp Day # 2 PCP Sachi Evans MD       Subjective:    Олег Esteves No large or central pulmonary embolus. 5. Multiple chronic vertebral body compression fractures some of which have kyphoplasty changes. 6. Evaluation limited by respiratory related motion artifact.     Dictated by (CST): Arya Ordonez MD on 1/04/2020 at 21

## 2020-01-06 NOTE — PROGRESS NOTES
[place] [facility] - [facility]    Progress Note    Cydney Dire Patient Status:  Inpatient    3/9/1926 MRN [id number]   Location [facility] 5SW/SE Attending Chidi Deng MD   Hosp Day # 1 PCP Hany Martinez MD       Subjective:    Natanael Gaitan Daily  •  Levothyroxine Sodium (SYNTHROID) tab 50 mcg, 50 mcg, Oral, Before breakfast  •  Sertraline HCl (ZOLOFT) tab 25 mg, 25 mg, Oral, Daily  •  Vitamin B-12 (VITAMIN B12) tab 1,000 mcg, 1,000 mcg, Oral, Daily  •  dextrose 5 %-0.45 % NaCl infusion, , In by (CST): Servando Benitez MD on 1/04/2020 at 22:11          Xr Chest Ap Portable  (cpt=71045)    Result Date: 1/4/2020  CONCLUSION:  1. Suspect interstitial fibrotic changes.  2. Patchy opacity in the right upper lung may represent pneumonia or more pronounc

## 2020-01-06 NOTE — DIETARY NOTE
ADULT NUTRITION INITIAL ASSESSMENT    Pt is at high nutrition risk. Pt meets severe malnutrition criteria.       CRITERIA FOR MALNUTRITION DIAGNOSIS:  Criteria for severe malnutrition diagnosis: chronic illness related to energy intake less than 75% for gr • Other and unspecified hyperlipidemia    • Restless leg syndrome      ANTHROPOMETRICS:  HT:  61 in  WT:   40.8 kg (89#)  BMI: There is no height or weight on file to calculate BMI.   BMI CLASSIFICATION: less than 19 kg/m2 - underweight  IBW: 105 lbs documentation reviewed.   - Angel score 13    NUTRITION PRESCRIPTION:  Diet: Regular/General  Oral Supplements: Ensure Enlive TID  ESTIMATED NUTRITION NEEDS:  Calories: 1224-1346calories/day (30-33 calories per kg Current wt)  Protein: 61 grams protein/day

## 2020-01-06 NOTE — PLAN OF CARE
Problem: Patient/Family Goals  Goal: Patient/Family Long Term Goal  Description  Patient's Long Term Goal: to go home    Interventions:  - monitor labs and vital signs  - follow MD orders  - See additional Care Plan goals for specific interventions  1/6/ techniques  - Monitor for opioid side effects  - Notify MD/LIP if interventions unsuccessful or patient reports new pain  - Anticipate increased pain with activity and pre-medicate as appropriate  1/6/2020 1632 by Lluvia Adame RN  Outcome: Progressing appropriate  - Assess patient's ability to be responsible for managing their own health  - Refer to Case Management Department for coordinating discharge planning if the patient needs post-hospital services based on physician/LIP order or complex needs rel

## 2020-01-06 NOTE — H&P
[facility]    PATIENT'S NAME: Mervin Winter   ATTENDING PHYSICIAN: Antonella Thomas MD   PATIENT ACCOUNT#:   [id number]    LOCATION:  [address] [place] [address] Dr. Santos [facility] RECORD #:   [id number]       YOB: 1926  ADMISSION DATE: respiratory rate 18, temperature 98.1, pulse ox 92%. HEENT:  No JVD. No bruit. No lymphadenopathy. LUNGS:  Clear to auscultation. HEART:  S1, S2 heard. No S3, no S4. No murmur, no rub, no gallop. ABDOMEN:  Soft, nontender, nondistended.   No hepato 16:59:27  [facility] [id number]  /

## 2020-01-07 NOTE — PHYSICAL THERAPY NOTE
PHYSICAL THERAPY EVALUATION - INPATIENT     Room Number: 558/558-A  Evaluation Date: 1/7/2020  Type of Evaluation: Initial   Physician Order: PT Eval and Treat    Presenting Problem: COPD / CAP /SOB dehydration   ---pt with hx of dementia / chronic comp f to 96 % therefore unsure of accuracy of pulse oximetry during activity. Pt did present with drop in SBP and MAP with activity slightly. Pt did report increase fatigue with activity but denies SOB.    Pt with mod to max assist for bed mobility attempti family about level of care pt receives at current long-term. Per SW pt admitted from memory care unit.     Therapy does recommend continued skilled therapy during this admission and at next level of care if pt remains below baseline level of fxn prior to admiss Hysterectomy. Appendectomy. Patient is prediabetic. IMPRESSION:    1. Right upper lobe pneumonia. 2.       Chronic interstitial lung disease, possible radiation changes. 3.       Chronic obstructive pulmonary disease with exacerbation.   4. PAIN ASSESSMENT  Rating: Unable to rate     Management Techniques: Activity promotion; Body mechanics; Relaxation;Repositioning;Breathing techniques    COGNITION  · Overall Cognitive Status:  Impaired    RANGE OF MOTION AND STRENGTH ASSESSMENT  Upper ext Assistive Device: Rolling walker  Pattern: R Steppage;L Steppage;R Foot flat;L Foot flat;Shuffle(poor posture / unsteady gait )  Stoop/Curb Assistance: Not tested     Patient End of Session: Up in chair;Needs met;Call light within reach;RN aware of sessi

## 2020-01-07 NOTE — PLAN OF CARE
Problem: Patient/Family Goals  Goal: Patient/Family Long Term Goal  Description  Patient's Long Term Goal:     Interventions:  -   - See additional Care Plan goals for specific interventions  Outcome: Progressing  Goal: Patient/Family Short Term Goal  [place] guidelines  Outcome: Progressing     Problem: SAFETY ADULT - FALL  Goal: Free from fall injury  Description  INTERVENTIONS:  - Assess pt frequently for physical needs  - Identify cognitive and physical deficits and behaviors that affect risk of falls.   - I supplementation based on oxygen saturation or ABGs  - Provide Smoking Cessation handout, if applicable  - Encourage broncho-pulmonary hygiene including cough, deep breathe, Incentive Spirometry  - Assess the need for suctioning and perform as needed  - Ass

## 2020-01-07 NOTE — PROGRESS NOTES
[place] [facility] - [facility]    Progress Note    Floyce Folds Patient Status:  Inpatient    3/9/1926 MRN [id number]   Location [facility] 5SW/SE Attending Akbar Rosenberg MD   Hosp Day # 3 PCP Laura Sheffield MD       Subjective:    Stephany Almanza right lung    Dehydration    COPD exacerbation ([facility] [address]     Felt better,less sob and cough. continue antibiotic solumedrol and Emilie Conroy MD, MD  1/7/2020

## 2020-01-07 NOTE — PLAN OF CARE
Attempted to call granddaughter - Dom Hussein to update her on plan for discharge. No answer and voicemail was full could not leave message.

## 2020-01-07 NOTE — PLAN OF CARE
Problem: Patient/Family Goals  Goal: Patient/Family Long Term Goal  Description  Patient's Long Term Goal: Go home    Interventions:  - IVF  - Antibiotics  - solumedrol  - See additional Care Plan goals for specific interventions   1/7/2020 0443 by Daniel Sin interventions unsuccessful or patient reports new pain  - Anticipate increased pain with activity and pre-medicate as appropriate  1/7/2020 0443 by Otis Patel RN  Outcome: Progressing  1/7/2020 0438 by Otis Patel RN  Outcome: Progressing     Prob post-discharge preferences of patient/family/discharge partner  - Complete POLST form as appropriate  - Assess patient's ability to be responsible for managing their own health  - Refer to Case Management Department for coordinating discharge planning if t

## 2020-01-07 NOTE — CM/SW NOTE
Received MDO for d/c planning, PT recommending 24/7 supervision. Patient is from [facility]. Spoke with ac Mcmanus, she states patient is receiving 24/7 supervision through staff.  She does not feel patient requires NH/LTC & states patient

## 2020-01-07 NOTE — PROGRESS NOTES
[facility]    Progress Note    Juan oMya Patient Status:  Inpatient    3/9/1926 MRN [id number]   Location [facility] 5SW/SE Attending Aishwarya Calderón MD   Hosp Day # 2 PCP Gerald Mata MD       Subjective:    Luz Montalvo Daily  •  Levothyroxine Sodium (SYNTHROID) tab 50 mcg, 50 mcg, Oral, Before breakfast  •  Sertraline HCl (ZOLOFT) tab 25 mg, 25 mg, Oral, Daily  •  Vitamin B-12 (VITAMIN B12) tab 1,000 mcg, 1,000 mcg, Oral, Daily  •  dextrose 5 %-0.45 % NaCl infusion, , In 01/04/20. Xr Chest Ap Portable  (cpt=71045)    Result Date: 1/6/2020  CONCLUSION: No significant interval change. Persistent patchy bilateral interstitial and airspace opacity.      Dictated by (CST): Morenita Gomez MD on 1/06/2020 at 16:45     Appro

## 2020-01-08 PROBLEM — E46 MALNUTRITION (HCC): Status: ACTIVE | Noted: 2020-01-01

## 2020-01-08 NOTE — PROGRESS NOTES
[facility]    Progress Note    Floyce Folds Patient Status:  Inpatient    3/9/1926 MRN [id number]   Location [facility] 5SW/SE Attending Akbar Rosenberg MD   Hosp Day # 4 PCP Laura Sheffield MD       Subjective:    Stephany Stake pneumonia of right lung    Dehydration    COPD exacerbation (HCC)    Less sob and cough. O2 desiturated with exertion. home today         Cirilo Jefferson MD, MD  1/8/2020

## 2020-01-08 NOTE — CM/SW NOTE
MDO to HARPREET for SNF placement. HARPREET spoke to Andrew Agus @ ([phone] ext [phone]) [facility] is a company that works with the facility and provided pt  PT/OT needs. HARPREET faxed requested paperwork to ([phone]).   Pt will return

## 2020-01-08 NOTE — CDS QUERY
CDI UPDATE: Severe Malnutrition documented in Dr. Karen Light progress note dated 01/08/2020      How to Answer this Query    1.) Click \"Edit Button\" on the toolbar  2.) Type an \"X\" in the bracket for the diagnosis that applies.  (You may also add addit

## 2020-01-08 NOTE — OCCUPATIONAL THERAPY NOTE
OCCUPATIONAL THERAPY EVALUATION - INPATIENT     Room Number: 558/558-A  Evaluation Date: 1/8/2020  Type of Evaluation: Initial  Presenting Problem: PNA, dehydration, SOB    Physician Order: IP Consult to Occupational Therapy  Reason for Therapy: ADL/IADL D acquired pneumonia of right lung    Dehydration    COPD exacerbation ([facility] [address]      Past Medical History  Past Medical History:   Diagnosis Date   • Arthritis     type unknown   • Extrinsic asthma, unspecified    • HOSPITALIZATIONS     cancer removal, frequent need…  -   Putting on and taking off regular lower body clothing?: A Lot  -   Bathing (including washing, rinsing, drying)?: A Lot  -   Toileting, which includes using toilet, bedpan or urinal? : A Lot  -   Putting on and taking off regular upper body clot

## 2020-01-08 NOTE — PROGRESS NOTES
[facility]    Progress Note    Cydney Dire Patient Status:  Inpatient    3/9/1926 MRN [id number]   Location [facility] 5SW/SE Attending Chidi Deng MD   Hosp Day # 3 PCP Hany Martinez MD       Subjective:    Natanael Gaitan Levothyroxine Sodium (SYNTHROID) tab 50 mcg, 50 mcg, Oral, Before breakfast  •  Sertraline HCl (ZOLOFT) tab 25 mg, 25 mg, Oral, Daily  •  Vitamin B-12 (VITAMIN B12) tab 1,000 mcg, 1,000 mcg, Oral, Daily  •  dextrose 5 %-0.45 % NaCl infusion, , Intravenous continue to try to wean patient off oxygen. SW to f/u tomorrow.     Results:     Lab Results   Component Value Date    WBC 17.7 (H) 01/06/2020    HGB 10.5 (L) 01/06/2020    HCT 33.3 (L) 01/06/2020    .0 01/06/2020     01/06/2020    K 4.7 01/06/

## 2020-01-08 NOTE — PLAN OF CARE
Problem: Patient/Family Goals  Goal: Patient/Family Long Term Goal  Description  Patient's Long Term Goal: Go home    Interventions:  - IVF  - Antibiotics  - solumedrol  - See additional Care Plan goals for specific interventions   Outcome: Progressing period  Description  INTERVENTIONS  - Monitor WBC  - Administer growth factors as ordered  - Implement neutropenic guidelines  Outcome: Progressing     Problem: SAFETY ADULT - FALL  Goal: Free from fall injury  Description  INTERVENTIONS:  - Assess pt freq mentation and behavior  - Position to facilitate oxygenation and minimize respiratory effort  - Oxygen supplementation based on oxygen saturation or ABGs  - Provide Smoking Cessation handout, if applicable  - Encourage broncho-pulmonary hygiene including c

## 2020-01-08 NOTE — PLAN OF CARE
Problem: Patient/Family Goals  Goal: Patient/Family Long Term Goal  Description  Patient's Long Term Goal: Go home    Interventions:  - IVF  - Antibiotics  - solumedrol  - See additional Care Plan goals for specific interventions   Outcome: Progressing period  Description  INTERVENTIONS  - Monitor WBC  - Administer growth factors as ordered  - Implement neutropenic guidelines  Outcome: Progressing     Problem: SAFETY ADULT - FALL  Goal: Free from fall injury  Description  INTERVENTIONS:  - Assess pt freq effort  - Oxygen supplementation based on oxygen saturation or ABGs  - Provide Smoking Cessation handout, if applicable  - Encourage broncho-pulmonary hygiene including cough, deep breathe, Incentive Spirometry  - Assess the need for suctioning and perform

## 2020-01-09 NOTE — PLAN OF CARE
Problem: Patient/Family Goals  Goal: Patient/Family Long Term Goal  Description  Patient's Long Term Goal: Go home    Interventions:  - IVF  - Antibiotics  - solumedrol  - See additional Care Plan goals for specific interventions   Outcome: Adequate for fever/infection during anticipated neutropenic period  Description  INTERVENTIONS  - Monitor WBC  - Administer growth factors as ordered  - Implement neutropenic guidelines  Outcome: Adequate for Discharge     Problem: SAFETY ADULT - FALL  Goal: Free from behavior  - Position to facilitate oxygenation and minimize respiratory effort  - Oxygen supplementation based on oxygen saturation or ABGs  - Provide Smoking Cessation handout, if applicable  - Encourage broncho-pulmonary hygiene including cough, deep kiara they choose  - Honor patient and family perspectives and choices    Outcome: Adequate for Discharge     Problem: PAIN - ADULT  Goal: Verbalizes/displays adequate comfort level or patient's stated pain goal  Description  INTERVENTIONS:  - Encourage pt to mo caregiver  - Include patient/family/discharge partner in discharge planning  - Arrange for needed discharge resources and transportation as appropriate  - Identify discharge learning needs (meds, wound care, etc)  - Arrange for interpreters to assist at [facility] Goal  Description  Patient's Long Term Goal: Go home    Interventions:  - IVF  - Antibiotics  - solumedrol  - See additional Care Plan goals for specific interventions   Outcome: Adequate for Discharge  Goal: Patient/Family Short Term Goal  Description  Pa period  Description  INTERVENTIONS  - Monitor WBC  - Administer growth factors as ordered  - Implement neutropenic guidelines  Outcome: Adequate for Discharge     Problem: SAFETY ADULT - FALL  Goal: Free from fall injury  Description  INTERVENTIONS:  - Ass and minimize respiratory effort  - Oxygen supplementation based on oxygen saturation or ABGs  - Provide Smoking Cessation handout, if applicable  - Encourage broncho-pulmonary hygiene including cough, deep breathe, Incentive Spirometry  - Assess the need f

## 2020-01-09 NOTE — PLAN OF CARE
Problem: Patient/Family Goals  Goal: Patient/Family Long Term Goal  Description  Patient's Long Term Goal: Go home    Interventions:  - IVF  - Antibiotics  - solumedrol  - See additional Care Plan goals for specific interventions   Outcome: Adequate for neutropenic period  Description  INTERVENTIONS  - Monitor WBC  - Administer growth factors as ordered  - Implement neutropenic guidelines  Outcome: Adequate for Discharge     Problem: SAFETY ADULT - FALL  Goal: Free from fall injury  Description  INTERVENT oxygenation and minimize respiratory effort  - Oxygen supplementation based on oxygen saturation or ABGs  - Provide Smoking Cessation handout, if applicable  - Encourage broncho-pulmonary hygiene including cough, deep breathe, Incentive Spirometry  - Asses

## 2020-01-09 NOTE — CM/SW NOTE
01/09/20 1100   Discharge disposition   Expected discharge disposition [facility]   Name of Facillity/Home Care/Hospice   [facility])   Outpatient services Physical therapy; Occupational therapy   Discharge transportation [address]  ([address] per

## 2020-01-09 NOTE — PLAN OF CARE
Problem: Patient/Family Goals  Goal: Patient/Family Long Term Goal  Description  Patient's Long Term Goal: Go home    Interventions:  - IVF  - Antibiotics  - solumedrol  - See additional Care Plan goals for specific interventions   Outcome: Adequate for fever/infection during anticipated neutropenic period  Description  INTERVENTIONS  - Monitor WBC  - Administer growth factors as ordered  - Implement neutropenic guidelines  Outcome: Adequate for Discharge     Problem: SAFETY ADULT - FALL  Goal: Free from behavior  - Position to facilitate oxygenation and minimize respiratory effort  - Oxygen supplementation based on oxygen saturation or ABGs  - Provide Smoking Cessation handout, if applicable  - Encourage broncho-pulmonary hygiene including cough, deep kiara

## 2020-01-09 NOTE — PROGRESS NOTES
[facility]    Progress Note    Floyce Folds Patient Status:  Inpatient    3/9/1926 MRN [id number]   Location [facility] 5SW/SE Attending Akbar Rosenberg MD   Hosp Day # 4 PCP Laura Sheffield MD       Subjective:    Stephany Almanza Oral, Daily    Allergies    Pcn [Penicillins]       RASH, ITCHING    Assessment and Plan:         Patient has severe malnutrition due to following   * BMI 17.00  * Severe body fat depletion to orbital and triceps region  * Severe muscle mass depletion to c oxygen. SW to f/u tomorrow. 01/08/20  Patient likely to discharge to [facility] once setup and medications are delivered.     Results:     Lab Results   Component Value Date    WBC 17.7 (H) 01/06/2020    HGB 10.5 (L) 01/06/2020    HCT 33.3 (L) 01/06/202

## 2020-01-10 ENCOUNTER — TELEPHONE (OUTPATIENT)
Dept: SCHEDULING | Age: 85
End: 2020-01-10

## 2020-01-10 DIAGNOSIS — R53.1 WEAKNESS GENERALIZED: Primary | ICD-10-CM

## 2020-01-20 ENCOUNTER — TELEPHONE (OUTPATIENT)
Dept: SCHEDULING | Age: 85
End: 2020-01-20

## 2020-02-01 RX ORDER — OMEPRAZOLE 20 MG/1
CAPSULE, DELAYED RELEASE ORAL
Qty: 90 CAPSULE | Refills: 3 | Status: SHIPPED | OUTPATIENT
Start: 2020-02-01

## 2020-02-11 RX ORDER — ALENDRONATE SODIUM 70 MG/1
TABLET ORAL
Qty: 13 TABLET | Refills: 3 | OUTPATIENT
Start: 2020-02-11

## 2020-02-11 RX ORDER — SERTRALINE HYDROCHLORIDE 25 MG/1
TABLET, FILM COATED ORAL
Qty: 30 TABLET | Refills: 11 | OUTPATIENT
Start: 2020-02-11

## 2020-02-11 RX ORDER — LANOLIN ALCOHOL/MO/W.PET/CERES
CREAM (GRAM) TOPICAL
Qty: 30 TABLET | OUTPATIENT
Start: 2020-02-11

## 2020-03-03 RX ORDER — LANOLIN ALCOHOL/MO/W.PET/CERES
CREAM (GRAM) TOPICAL
Qty: 30 TABLET | Refills: 11 | Status: SHIPPED | OUTPATIENT
Start: 2020-03-03

## 2020-03-03 RX ORDER — ALENDRONATE SODIUM 70 MG/1
TABLET ORAL
Qty: 13 TABLET | Refills: 0 | Status: SHIPPED | OUTPATIENT
Start: 2020-03-03

## 2020-03-03 RX ORDER — SERTRALINE HYDROCHLORIDE 25 MG/1
TABLET, FILM COATED ORAL
Qty: 30 TABLET | Refills: 0 | Status: SHIPPED | OUTPATIENT
Start: 2020-03-03 | End: 2020-10-06 | Stop reason: SDUPTHER

## 2020-03-30 NOTE — ED NOTES
Patient safe to DC home per MD. DC teaching done, instructions reviewed with patient including when and how to follow up with healthcare providers and when to seek emergency care. The patient verbalizes understanding.   Patient wheeled to exit via superior

## 2020-03-30 NOTE — ED INITIAL ASSESSMENT (HPI)
Patient presents via EMS, for a mechanical fall today. No blood thinners. She is from [address] [place] [address]. Is AOx1 upon arrival, unsure of baseline but she does have a history of dementia. Denies LOC, was able to call for help after fall.    Jeni Martin

## 2020-03-31 NOTE — ED PROVIDER NOTES
Patient Seen in: [facility]    History   Patient presents with:  Fall      HPI    Patient presents to the ED after falling today. She states she recalls this fall. Called for help. Patient denies loss of consciousness.   His including droplet mask, eye protection, and gloves were worn throughout the duration of the exam.  Handwashing was performed prior to and after the exam.  Stethoscope and any equipment used during my examination was cleaned with super sani-cloth germicidal records for any recent pertinent discharge summaries, testing, and procedures and reviewed those reports. Complicating Factors: The patient already has does not have any pertinent problems on file. to contribute to the complexity of this ED evaluation.

## 2020-04-03 ENCOUNTER — TELEPHONE (OUTPATIENT)
Dept: SCHEDULING | Age: 85
End: 2020-04-03

## 2020-09-03 NOTE — DISCHARGE SUMMARY
[facility]    PATIENT'S NAME: Dea Auguste   ATTENDING PHYSICIAN: Tonya Baird MD   PATIENT ACCOUNT#:   [id number]    LOCATION:  [address] [place] [address] RECORD #:   [id number]       YOB: 1926  ADMISSION DATE: 06:55:44  t: 09/02/2020 05:58:49  [facility] [id number]  /

## 2020-10-06 RX ORDER — LEVOTHYROXINE SODIUM 0.05 MG/1
50 TABLET ORAL DAILY
Qty: 90 TABLET | Refills: 3 | Status: SHIPPED | OUTPATIENT
Start: 2020-10-06

## 2020-10-06 RX ORDER — SERTRALINE HYDROCHLORIDE 25 MG/1
25 TABLET, FILM COATED ORAL DAILY
Qty: 30 TABLET | Refills: 11 | Status: SHIPPED | OUTPATIENT
Start: 2020-10-06

## 2020-11-09 PROBLEM — K56.609 SMALL BOWEL OBSTRUCTION (HCC): Status: ACTIVE | Noted: 2020-01-01

## 2020-11-09 NOTE — ED INITIAL ASSESSMENT (HPI)
Pt brought via ems from [employer], pt had a couple of vomiting episodes today, RN come on at 1500 and per ems had two vomiting episodes, pt received 4mg zofran en route, pt nonverbal per normal.

## 2020-11-09 NOTE — ED PROVIDER NOTES
Patient Seen in: [facility] AND [facility] Emergency Department      History   Patient presents with:  Vomiting    Stated Complaint: vomiting    HPI    The patient is a 29-year-old female with a history of dementia sent from the nursing home for 2 episodes of vo Mouth/Throat:      Mouth: Mucous membranes are moist.   Eyes:      Conjunctiva/sclera: Conjunctivae normal.      Pupils: Pupils are equal, round, and reactive to light. Neck:      Musculoskeletal: Normal range of motion and neck supple.       Vascular: No following components:    WBC 16.2 (*)     Neutrophil Absolute Prelim 15.22 (*)     Neutrophil Absolute 15.22 (*)     Lymphocyte Absolute 0.38 (*)     All other components within normal limits   RAPID SARS-COV-2 BY PCR - Normal   CBC WITH DIFFERENTIAL WITH Abdon Han MD on 11/09/2020 at 6:38 PM            Radiology exams  Viewed and reviewed by myself and findings discussed with patient including need for follow up    Admission disposition: 11/9/2020  8:44 PM         Critical care  A total of 30 minutes of c

## 2020-11-10 PROBLEM — K56.41 FECAL IMPACTION (HCC): Status: ACTIVE | Noted: 2020-01-01

## 2020-11-10 PROBLEM — J84.10 PULMONARY FIBROSIS (HCC): Status: ACTIVE | Noted: 2020-01-01

## 2020-11-10 NOTE — H&P
[facility]    PATIENT'S NAME: Tasneem Julien   ATTENDING PHYSICIAN: Robbie Spears MD   PATIENT ACCOUNT#:   [id number]    LOCATION:  [address] [place] [address] Rd #:   [id number]       YOB: 1926  ADMISSION DATE:       11/09/2 admission included Tylenol 650 mg q.6 h. as needed for pain, vitamin D 5000 units daily, fluticasone furoate and vilanterol 100/25 one puff in the lungs daily, levothyroxine 50 mcg before breakfast, omeprazole 20 mg daily, sertraline 25 mg daily, vitamin B small dose of Haldol and restraints as she was trying to pull out her NG tube. LABORATORY DATA:  Previously mentioned. ASSESSMENT AND PLAN:    1. Small-bowel obstruction hopefully will resolve with conservative treatment and NG suction.   We will ob

## 2020-11-10 NOTE — ED NOTES
Report given ERIC Judd. Patient had small bowel movement. Patient cleaned up and changed into new brief.

## 2020-11-10 NOTE — OCCUPATIONAL THERAPY NOTE
Orders received, chart reviewed for OT evaluation. Pt is R/O COVID. Pt with bed rest orders. Physical therapist discussed with ERIC Grayson who reported pt not appropriate for therapy today, to please follow-up tomorrow.

## 2020-11-10 NOTE — CONSULTS
[facility] - [facility]    Report of Consultation    Jacqueline Jha Patient Status:  Inpatient    3/9/1926 MRN [id number]   Location [facility] 5SW/SE Attending Melecio Blackman MD   Hosp Day # 0 PCP Neftaly Mir MD     Date of Admission: Continuous  •  ondansetron HCl (ZOFRAN) injection 4 mg, 4 mg, Intravenous, Once    Review of Systems:    Gotten from [facility] in room, later in day  Pt has lost large amt wt, family has not been able to see due to covid  Pt unable to speak coherently fo 11/09/2020 at 7:43 PM     Finalized by (CST): Dalton Stafford MD on 11/09/2020 at 7:51 PM          Xr Chest Ap Portable  (cpt=71045)    Result Date: 11/10/2020  CONCLUSION:  1. New enteric tube projects over the expected location of the stomach, in

## 2020-11-10 NOTE — PROGRESS NOTES
[facility]  Hospitalist Progress Note     Regan Vinson Patient Status:  Inpatient      80year old CSN [id number]   Location 553/553-A Attending Homero Singh MD   Hosp Day # 0 PCP Brandyn Mayen MD     ASSESSMENT/PLAN    Sma (Porcine)  5,000 Units Subcutaneous Q12H [facility] & half-way   • levothyroxine sodium  25 mcg Intravenous Daily   • pantoprazole (PROTONIX) IV push  40 mg Intravenous Q24H   • ondansetron HCl  4 mg Intravenous Once     Normal Saline Flush, ondansetron HCl  **Certification

## 2020-11-10 NOTE — PLAN OF CARE
VSS  NG to LIS  IVF infusing  Restraints in place.         Problem: Patient Centered Care  Goal: Patient preferences are identified and integrated in the patient's plan of care  Description: Interventions:  - What would you like us to know as we care for yo managing their own health  - Refer to Case Management Department for coordinating discharge planning if the patient needs post-hospital services based on physician/LIP order or complex needs related to functional status, cognitive ability or social support

## 2020-11-10 NOTE — PLAN OF CARE
Pt denies pain, sob, chills, fever,n/v. Medication reconciled. VS monitored. Call light in reach. Medication reviewed. Safety maintained. We will continue to monitor.   Problem: Safety Risk - Non-Violent Restraints  Goal: Patient will remain free from self- specific interventions  Outcome: Progressing

## 2020-11-10 NOTE — ED NOTES
.Orders for admission, pt is aware of plan, ready to go upstairs, any questions, please call ed rn Jorge Toledo @ ext [phone]. Patient nonverbal per normal. Patient does say some words at times.  Patient's granddaughter (POA) was contacted and updated on plan of car

## 2020-11-10 NOTE — PHYSICAL THERAPY NOTE
PT evaluation orders received and chart reviewed. Spoke to [employer], [employer]. Patient not appropriate for therapy today. Current bedrest orders and r/o covid.   Will reschedule for 11/11/20    Thank you,  Josue Hammonds, PT, DPT [facility]

## 2020-11-10 NOTE — PROGRESS NOTES
ADMISSION NOTE    80year old female NHR with dementia non verbal sent from NH after 2 episdoes of emesis. Found to have distal SBO and hypoxemia . Available medical records partially reviewed. Dictation to follow.     Asa Meyer M.D.  11/9/2020

## 2020-11-11 PROBLEM — Z71.89 GOALS OF CARE, COUNSELING/DISCUSSION: Status: ACTIVE | Noted: 2020-01-01

## 2020-11-11 PROBLEM — Z71.89 ADVANCE CARE PLANNING: Status: ACTIVE | Noted: 2020-01-01

## 2020-11-11 NOTE — PLAN OF CARE
Problem: ALTERED NUTRIENT INTAKE - ADULT  Goal: Nutrient intake appropriate for improving, restoring or maintaining nutritional needs  Description: INTERVENTIONS:  - Assess nutritional status and recommend course of action  - Monitor oral intake when eat

## 2020-11-11 NOTE — PROGRESS NOTES
[facility]    Progress Note    Benedictuzma Mullins Patient Status:  Inpatient    3/9/1926 MRN [id number]   Location [facility] 5SW/SE Attending Meryle Guarneri, MD   Hosp Day # 1 PCP Thao Huffman MD       Subjective:     More c TSH 3.620 11/10/2020    MG 2.2 11/10/2020    PHOS 4.2 11/10/2020       Ct Abdomen+pelvis(cpt=74176)    Result Date: 11/9/2020  CONCLUSION:  1. Distal small bowel obstruction. 2. Fecal impaction.     Dictated by (CST): Spring Jimenez MD on 11/09/ visualized consistent with SBO.     Dictated by (CST): Ben Stafford MD on 11/10/2020 at 12:43 PM     Finalized by (CST): Ben Stafford MD on 11/10/2020 at 12:45 PM                Interval notes, results reviewed  D/w RN  Greater than 35

## 2020-11-11 NOTE — PHYSICAL THERAPY NOTE
PM PM note: Per Social Work report pt and family are considering hospice services. PT will hold at this time to allow hospice consultation. PT will progress as per pt and family wishes as medical progress allows.     1550 PM Pt and family have agreed to pal

## 2020-11-11 NOTE — DIETARY NOTE
ADULT NUTRITION INITIAL ASSESSMENT    RECOMMENDATIONS TO MD:    · Noted plan for conservative supportive medical treatments. Will monitor Goals of care discussion for appropriate Nutrition Therapy.    · RD will be available for recommendation if Nutrition S treat conservatively. Palliative care eval noted. Code status is DNAR/Comfort-Focused treatment. Continue conservative supportive medical treatments; granddaughter would likely not pursue any surgeries.  Granddaughter is agreeable to meeting with [place] severe wt loss and severely low Bmi  Intake: NPO  Intake Meeting Needs: No   Food Allergies: No   Cultural/Ethnic/Amish Preferences: No    MEDICATIONS : reviewed : synthroid, Kcl, others noted.    • sodium chloride 83 mL/hr at 11/11/20 0936     LABS: no

## 2020-11-11 NOTE — PLAN OF CARE
Pt denies sob, chills, fever, n/v. Pt still has NG tube with intermittent suction. Pt is on soft restraints. No S&S of injuries. Enema completed. Pt has 2 large BM. IV Fluids run without difficulty. Medication reviewed. Safety maintained.  Call light in reac Short Term Goal  Description: Patient's Short Term Goal: pt unable to verbalize.   Monitor VS  -Monitor for Pain  -Monitor I&O      Interventions:   - VS monitored  -Call light in reach  -Safety maintained    - See additional Care Plan goals for specific in care  Description: Interventions:  - Assess communication ability and preferred communication style  - Implement communication aides and strategies  - Use visual cues when possible  - Listen attentively, be patient, do not interrupt  - Minimize distraction INTEGRITY - ADULT  Goal: Skin integrity remains intact  Description: INTERVENTIONS  - Assess and document risk factors for pressure ulcer development  - Assess and document skin integrity  - Monitor for areas of redness and/or skin breakdown  - Initiate in

## 2020-11-11 NOTE — CONSULTS
[address] Patient Status:  Inpatient    3/9/1926 MRN [id number]   Location [facility] 5SW/SE Attending Alexis Huerta MD   Hosp Day # 1 PCP Padmini Garcia MD     Date of Surgery service.      PAST MEDICAL HISTORY:  Significant for dementia, arthritis, asthma, pneumonia, hyperlipidemia, history of restless leg syndrome.      PAST SURGICAL HISTORY:  Status post hernia surgery, status post hysterectomy, status post lumpectomy Facility-Administered Medications:   •  potassium chloride 40 mEq in sodium chloride 0.9% 250 mL IVPB, 40 mEq, Intravenous, Once  •  Fluticasone Furoate-Vilanterol (BREO ELLIPTA) 100-25 MCG/INH inhaler 1 puff, 1 puff, Inhalation, Daily  •  Normal Saline Fl customary positioning. 2. Stable findings of a chronic interstitial lung disease. 3. Stable chronic compression deformities in the thoracolumbar spine with changes of prior vertebroplasties at T10, T11 and L1.    A preliminary report was issued by the [facility] frail-appearing  Nutritional status:cachectic   HEENT: [place], dry mucous membranes, + NG, speech is difficult to understand at times  Chest appearance: Kyphosis  Cardiac: deferred  Lungs: Normal excursions and effort. Audible upper airway congestion heard. says that given how thin Lore Cruz has become that she does not think that Sheri Mchugh be able to weather a surgery. \" She would like for us to continue conservative supportive medical treatments to ensure Doreen's comfort.     I asked Mai Jack if she examination, and >50% was spent counseling and coordinating care. Discussed today's visit with ERIC Guzman from [facility]. Communicated with Dr. Vale Bañuelos and the treatment team through [address] [place] [address] Rd.      Thank you for allowing the Palliative Care Service

## 2020-11-11 NOTE — CM/SW NOTE
APN order to SW for informational hospice. Per PC APN Andreina Tse pt grandtr is requesting hospice information. Per APN, pt's grandtr/POA does not want pt to return to [facility] d/t weight loss.      HARPREET informed [facility] and provided pt's g

## 2020-11-11 NOTE — PROGRESS NOTES
Pt requested anointing from a . Lou ribeiro will be in the hospital Thursday around 11:30 am and will come by.       11/11/20 8926   Clinical Encounter Type   Visited With Patient   Routine Visit Introduction   Referral From Patient;Nurs

## 2020-11-11 NOTE — PROGRESS NOTES
[place] [facility] - [facility]    Progress Note    Isabela Birmingham Patient Status:  Inpatient    3/9/1926 MRN [id number]   Location [facility] 5SW/SE Attending Paco Madison MD   Hosp Day # 1 PCP Zay Milner MD     Subjective:     Non verb push  40 mg Intravenous Q24H   • ondansetron HCl  4 mg Intravenous Once       Results:       Recent Labs   Lab 11/09/20  1716 11/10/20  0736 11/11/20  0731   RBC 4.98 4.06 3.66*   HGB 14.5 11.7* 10.6*   HCT 45.3 37.0 34.1*   MCV 91.0 91.1 93.2   MCH 29.1 2 6:38 PM          Xr Abdomen Obstructive Series Routine(2 Vw)(cpt=74019)    Result Date: 11/11/2020  CONCLUSION:  1. Moderate stool distal rectosigmoid colon has improved consistent with constipation/fecal retention.  2. Persistent small bowel dilatation con

## 2020-11-12 NOTE — PROGRESS NOTES
[place] [facility] - [facility]    Progress Note    Janiyaleslie Antonioall Patient Status:  Inpatient    3/9/1926 MRN [id number]   Location [facility] 5SW/SE Attending Omer Purcell MD   Hosp Day # 2 PCP Craig Le MD       Subjective:     More c possible, from [place] [address] does not want her to return there because of significant weight loss    Results:     Lab Results   Component Value Date    WBC 10.9 11/12/2020    HGB 11.2 (L) 11/12/2020    HCT 36.1 11/12/2020    .0 11/12/

## 2020-11-12 NOTE — CM/SW NOTE
MSW placed call to pts relative, Berny Dyer and was unable to leave  due to full mailbox.  left for other relative Lacy Welch and provided [facility] CB#;  Will await return call to schedule hospice informational mtg.

## 2020-11-12 NOTE — PLAN OF CARE
Problem: Safety Risk - Non-Violent Restraints  Goal: Patient will remain free from self-harm  Description: INTERVENTIONS:  - Apply the least restrictive restraint to prevent harm  - Notify patient and family of reasons restraints applied  - Assess for an SAFETY ADULT - FALL  Goal: Free from fall injury  Description: INTERVENTIONS:  - Assess pt frequently for physical needs  - Identify cognitive and physical deficits and behaviors that affect risk of falls.   - Elk fall precautions as indicated by asse method for patient to ask for assistance (call light)  - Provide an  as needed  - Communicate barriers and strategies to overcome with those who interact with patient  Outcome: Progressing     Problem: GASTROINTESTINAL - ADULT  Goal: Minimal or needed  Outcome: Progressing       Patient mainly nonverbal, will point and nod to communicate  Soft wrist restraints as ordered, no noted complications  NG tube to low intermittent suction as ordered  Patient weaned from 1L to RA, tolerating well  Patient

## 2020-11-12 NOTE — PALLIATIVE CARE NOTE
[place] [facility]  Palliative Care Follow Up    Juancarlos Morales Patient Status:  Inpatient    3/9/1926 MRN [id number]   Location [facility] 5SW/SE Attending Bairon Llanes MD   Hosp Day # 2 PCP Teodoro Hernandez MD     Date of [facility] infusion, , Intravenous, Continuous  •  Fluticasone Furoate-Vilanterol (BREO ELLIPTA) 100-25 MCG/INH inhaler 1 puff, 1 puff, Inhalation, Daily  •  Normal Saline Flush 0.9 % injection 3 mL, 3 mL, Intravenous, PRN  •  Heparin Sodium (Porcine) 5000 UNIT/ML in Palliative Performance Scale : 10%    Advance Directives:  Have advanced directives been discussed with patient or healthcare power of : Yes        Healthcare Agent Appointed: Yes  Healthcare Agent's Name: Rizwana Vigil (granddaughter) counseling/discussion  -Code status is DNAR/Comfort-Focused treatment; valid POLST is in Epic  -#1 HCPOA is granddaughter Greg Ramos     Palliative Performance Scale 10%  Advance care planning counseling/discussion    Palliative Care Follow-up:  I spent a

## 2020-11-12 NOTE — HOSPICE RN NOTE
[facility] spoke with Paco [place] the grand daughter over the phone. She said she wants to take the patient home and not back to the SNF. We are meeting on 11/13/2020 at 2pm and we will discuss Hospice.

## 2020-11-12 NOTE — PLAN OF CARE
Pt speaks very little; mostly nonverbal. Does not respond to questions. Complete care turning Q2H. NPO. Had NG in place beginning of shift with soft wrist restraints on. Pt removed NG tube; MD and on call surgeon notified.  NG was not reinserted per both MD assistance with activity based on assessment  - Modify environment to reduce risk of injury  - Provide assistive devices as appropriate  - Consider OT/PT consult to assist with strengthening/mobility  - Encourage toileting schedule  Outcome: Progressing algorithm/standards of care as needed  Outcome: Progressing     Problem: Altered Communication/Language Barrier  Goal: Patient/Family is able to understand and participate in their care  Description: Interventions:  - Assess communication ability and prefe

## 2020-11-12 NOTE — CDS QUERY
Dr. Deana Laguna: To answer this query:   1. Click \"Edit\" button on the toolbar. 2. Type an \"X\" in the bracket for diagnosis(s) that apply. (You may also add additional clinical details as you feel necessary to substantiate your response.)  3.  Click on \"S specify the degree of malnutrition   PLEASE (X) ALL DIAGNOSES THAT APPLY.   SELECTION BY PHYSICIAN ONLY    (  x)  Severe  Malnutrition    (  )  Moderate  Malnutrition    (  )  Mild  Malnutrition    (  )  Other Explanation (please specify):     (  ) Clinical

## 2020-11-13 NOTE — HOSPICE RN NOTE
[facility] was waiting for the grand daughter today for the 2pm meeting. As of [phone] there is no one in the room except the patient. I have called the grand daughter twice and there was no answer. We tried calling her brother and he said he would try

## 2020-11-13 NOTE — PROGRESS NOTES
[place] [facility] - [facility]    Progress Note    Val Pod Patient Status:  Inpatient    3/9/1926 MRN [id number]   Location [facility] 5SW/SE Attending Maribell Sanches MD   Hosp Day # 3 PCP Kelly Wan MD     Subjective:     Non verb Intravenous Daily   • pantoprazole (PROTONIX) IV push  40 mg Intravenous Q24H   • ondansetron HCl  4 mg Intravenous Once       Results:       Recent Labs   Lab 11/11/20  0731 11/12/20  0721 11/13/20  0944   RBC 3.66* 3.88 3.67*   HGB 10.6* 11.2* 10.8*   HC

## 2020-11-13 NOTE — PLAN OF CARE
Problem: Patient Centered Care  Goal: Patient preferences are identified and integrated in the patient's plan of care  Description: Interventions:  - What would you like us to know as we care for you?  Pt is non-verbal at times, speaks occasionally, from appropriate resources  Description: INTERVENTIONS:  - Identify barriers to discharge w/pt and caregiver  - Include patient/family/discharge partner in discharge planning  - Arrange for needed discharge resources and transportation as appropriate  - Identif Progressing  Goal: Maintains or returns to baseline bowel function  Description: INTERVENTIONS:  - Assess bowel function  - Maintain adequate hydration with IV or PO as ordered and tolerated  - Evaluate effectiveness of GI medications  - Encourage mobiliza

## 2020-11-13 NOTE — PALLIATIVE CARE NOTE
[facility]  Palliative Care Follow Up    Darío Clay Patient Status:  Inpatient    3/9/1926 MRN [id number]   Location [facility] 5SW/SE Attending Karlie Diallo MD   Hosp Day # 3 PCP Jian Rodriguez MD     Date of [facility]  •  ondansetron HCl (ZOFRAN) injection 4 mg, 4 mg, Intravenous, Q6H PRN  •  levothyroxine Sodium (SYNTHROID) 25 mcg in Sodium Chloride (PF) 0.9 % IV push, 25 mcg, Intravenous, Daily  •  Pantoprazole Sodium (PROTONIX) 40 mg in Sodium Chloride (PF) 0.9 % Palliative Care Assessment/Plan:    Small bowel obstruction (HCC)       Fecal impaction (HCC)       Advanced dementia       Asthma       Hypothyroidism       Malnutrition       Hypercalcemia       Hyperglycemia       Goals of care counseling/discussion

## 2020-11-13 NOTE — PLAN OF CARE
VSS. No evidence of pain noted. Pt remains NPO. Hypoglycemic event this morning - D50 administered; IVF adjusted. Sponge bath and up to chair this evening. Granddaughter to have meeting with [facility] at 2pm tomorrow.  Anticipated abdominal x-ray environment to reduce risk of injury  - Provide assistive devices as appropriate  - Consider OT/PT consult to assist with strengthening/mobility  - Encourage toileting schedule  Outcome: Progressing     Problem: DISCHARGE PLANNING  Goal: Discharge to home intermittent suction as ordered  - Evaluate effectiveness of ordered antiemetic medications  - Provide nonpharmacologic comfort measures as appropriate  - Advance diet as tolerated, if ordered  - Obtain nutritional consult as needed  - Evaluate fluid loraine

## 2020-11-13 NOTE — PROGRESS NOTES
Floor rn called Dr. Antwon Treviño re:ng tube, per dr. Lock Carry leave out ng tube for now. Pt not having any nasuea, vomitn, no stools.

## 2020-11-13 NOTE — PROGRESS NOTES
Hospice rn in room talking with carolin re: hospice. Carolin(poa) Dannielle Woodall also signed consent for picc line.

## 2020-11-13 NOTE — PLAN OF CARE
Problem: Patient Centered Care  Goal: Patient preferences are identified and integrated in the patient's plan of care  Description: Interventions:  - What would you like us to know as we care for you?  Pt is non-verbal at times, speaks occasionally, from Te resources  Description: INTERVENTIONS:  - Identify barriers to discharge w/pt and caregiver  - Include patient/family/discharge partner in discharge planning  - Arrange for needed discharge resources and transportation as appropriate  - Identify discharge cues when possible  - Listen attentively, be patient, do not interrupt  - Minimize distractions  - Allow time for understanding and response  - Establish method for patient to ask for assistance (call light)  - Provide an  as needed  - Communica

## 2020-11-13 NOTE — PROGRESS NOTES
[place] [facility]    Progress Note    Mikie Vallejo Patient Status:  Inpatient    3/9/1926 MRN [id number]   Location [facility] 5SW/SE Attending Felix Sierra MD   Hosp Day # 3 PCP Hector Becker MD     Subjective:     Non verb Sodium (Porcine)  5,000 Units Subcutaneous Q12H [facility]   • levothyroxine sodium  25 mcg Intravenous Daily   • pantoprazole (PROTONIX) IV push  40 mg Intravenous Q24H   • ondansetron HCl  4 mg Intravenous Once       Results:       Recent Labs   Lab 11/10/20  0

## 2020-11-13 NOTE — PROGRESS NOTES
Residential Liaison met gdrenee/KOKO Felton bedside to discuss Residential Hospice Services. Liaison explained Residential hospice philosophy, levels of care with focus on routine level of care in the home, [address], caregiving resources, and covered benefits.  All q

## 2020-11-14 NOTE — PLAN OF CARE
No acute changes during shift. Patient became agitated when given medication, however agitation improves with rest. No complaints of pain. Mepilex and heel boots in place for preventative measures. Adequate conklin output.  Plan is possible discharge home wit pt to call for assistance with activity based on assessment  - Modify environment to reduce risk of injury  - Provide assistive devices as appropriate  - Consider OT/PT consult to assist with strengthening/mobility  - Encourage toileting schedule  Outcome: with IV or PO as ordered and tolerated  - Nasogastric tube to low intermittent suction as ordered  - Evaluate effectiveness of ordered antiemetic medications  - Provide nonpharmacologic comfort measures as appropriate  - Advance diet as tolerated, if order

## 2020-11-14 NOTE — PROGRESS NOTES
[place] [facility] - [facility]    Progress Note    Freedom Telles Patient Status:  Inpatient    3/9/1926 MRN [id number]   Location [facility] 5SW/SE Attending Karissa Lopez MD   Hosp Day # 3 PCP Julian Zendejas MD       Subjective:     More c does not want her to return there because of significant weight loss    D/w patient's granddaughter/POA at length over the phone, updated about patient medical status, she told me that she would like to continue conservative treatment for SBO, she is hopef

## 2020-11-14 NOTE — DIETARY NOTE
ADULT NUTRITION UPDATE NOTE    RECOMMENDATIONS TO MD:    · PN as ordered. .    Pt is at high nutrition risk. Pt meets severe malnutrition criteria.       CRITERIA FOR MALNUTRITION DIAGNOSIS: Criteria for severe malnutrition diagnosis: chronic illness relate Residential Hospice for a hospice informational session/evaluation. TPN if pursuing aggressive Nutrition care, otherwise continue discussion on goals of care. 11/14 update: Clarified with RN and MD re: start TPN ordered yesterday.  MD desires to begin TPN kg (100 lb)     GASTROINTESTINAL Status:  .11/11 +BM.  +obstruction remains  FOOD/NUTRITION INTAKE:   PTA Appetite: suspect suboptimal appetite and po PTA given severe wt loss and severely low Bmi  Intake: NPO  Intake Meeting Needs: TPN meeting needs for in

## 2020-11-14 NOTE — PLAN OF CARE
Problem: SAFETY ADULT - FALL  Goal: Free from fall injury  Description: INTERVENTIONS:  - Assess pt frequently for physical needs  - Identify cognitive and physical deficits and behaviors that affect risk of falls.   - [place] fall precautions as indica

## 2020-11-14 NOTE — PROGRESS NOTES
[facility]    Progress Note    Darío Clay Patient Status:  Inpatient    3/9/1926 MRN [id number]   Location [facility] 5SW/SE Attending Karlie Diallo MD   Hosp Day # 4 PCP Jian Rodriguez MD       Subjective:     More q anti-arrhythmics, no pressors, no hemodialysis    Dispo: up as much as possible, from [address] does not want her to return there because of significant weight loss    D/w patient's granddaughter/POA at length over the phone, updated abo

## 2020-11-15 NOTE — PROGRESS NOTES
[facility]    Progress Note    Gloria Britton Patient Status:  Inpatient    3/9/1926 MRN [id number]   Location [facility] 5SW/SE Attending Fernando Alcocer MD   Hosp Day # 4 PCP Israel Benavidez MD     Subjective:     Non verb Subcutaneous Q12H [facility]   • levothyroxine sodium  25 mcg Intravenous Daily   • pantoprazole (PROTONIX) IV push  40 mg Intravenous Q24H   • ondansetron HCl  4 mg Intravenous Once       Results:       Recent Labs   Lab 11/12/20  0721 11/13/20  0944 11/14/20  0

## 2020-11-15 NOTE — HOSPICE RN NOTE
Pt started on TPN last night. Messages have been left for granddaughter. Not returning calls. Will continue to follow.

## 2020-11-15 NOTE — PROGRESS NOTES
[facility]    Progress Note    [facility] Patient Status:  Inpatient    3/9/1926 MRN [id number]   Location [facility] 5SW/SE Attending Kb Najera MD   Hosp Day # 5 PCP Tamera Valdes MD       Subjective:     More u feeding  Hypercalcemia  Hyperglycemia    DNR/DNI, no tube feeding, no anti-arrhythmics, no pressors, no hemodialysis    Dispo: up as much as possible, from [address] does not want her to return there because of significant weight loss

## 2020-11-15 NOTE — PLAN OF CARE
Problem: Patient Centered Care  Goal: Patient preferences are identified and integrated in the patient's plan of care  Description: Interventions:  - What would you like us to know as we care for you?  Pt is non-verbal at times, speaks occasionally, from on assessment  - Modify environment to reduce risk of injury  - Provide assistive devices as appropriate  - Consider OT/PT consult to assist with strengthening/mobility  - Encourage toileting schedule  11/14/2020 2304 by Dana Dubose RN  Outcome: Prog patient  11/14/2020 2304 by Richie Escobar, RN  Outcome: Progressing  11/14/2020 2303 by Richie Escobar, RN  Outcome: Progressing     Problem: GASTROINTESTINAL - ADULT  Goal: Minimal or absence of nausea and vomiting  Description: INTERVENTIONS:  Keerthi Reeves INTERVENTIONS  - Assess and document risk factors for pressure ulcer development  - Assess and document skin integrity  - Monitor for areas of redness and/or skin breakdown  - Initiate interventions, skin care algorithm/standards of care as needed  11/14/2

## 2020-11-15 NOTE — PLAN OF CARE
Problem: SAFETY ADULT - FALL  Goal: Free from fall injury  Description: INTERVENTIONS:  - Assess pt frequently for physical needs  - Identify cognitive and physical deficits and behaviors that affect risk of falls.   - [place] fall precautions as indica

## 2020-11-15 NOTE — PHYSICAL THERAPY NOTE
Patient discharged from skilled physical therapy while in the hospital. RN had asked MD if she is able to get patient up to bedside chair with lift, MD had updated activity orders and placed new PT consult. No need for new orders, no change in status.  RN w

## 2020-11-16 PROBLEM — E46 PROTEIN CALORIE MALNUTRITION (HCC): Status: ACTIVE | Noted: 2020-01-01

## 2020-11-16 NOTE — OCCUPATIONAL THERAPY NOTE
Per chart review, patient is at functional baseline for ADLs and mobility, requiring total assist at [facility]. No change in status, will d/c orders.

## 2020-11-16 NOTE — CM/SW NOTE
CM notified by Estephanie Bowman w/ [facility] pt is now GIP status, grandtr at bedside. / to remain available for support and/or discharge planning.      Kris Del Toro RN    Ext [phone]

## 2020-11-16 NOTE — PROGRESS NOTES
[facility] Pharmacy Note:  Renal Adjustment for levofloxacin [facility])    Page Chain is a 80year old patient who has been prescribed levofloxacin (LEVAQUIN) 250 mg every 24 hrs.   CrCl is estimated creatinine clearance is 19.3 mL/min (based on SCr of 0.8

## 2020-11-16 NOTE — HOSPICE RN NOTE
Hospice team met with pt granddaughter to discuss hospice philosophy. Pt is GIP appropriate for dyspnea. Discussed POC with Dr. Theo Hoffmann and hospice medial director Dr. Karen Brink, both are in agreement.  Admitting called, chart flipped, comfort orders ent

## 2020-11-16 NOTE — PROGRESS NOTES
[place] [facility] - [facility]    Progress Note    Floyce Folds Patient Status:  Inpatient    3/9/1926 MRN [id number]   Location [facility] 5SW/SE Attending Ewa Lawton MD   Hosp Day # 0 PCP Laura Sheffield MD       Subjective:     Zen Gloria granddaughter/POA at bedside, I believe pt is actively dying, very uncomfortable, appropriate for inpatient hospice  D/w RN and hospice    Results:     Lab Results   Component Value Date    WBC 14.0 (H) 11/16/2020    HGB 11.5 (L) 11/16/2020    HCT 34.9 (L)

## 2020-11-16 NOTE — PLAN OF CARE
Problem: Patient Centered Care  Goal: Patient preferences are identified and integrated in the patient's plan of care  Description: Interventions:  - What would you like us to know as we care for you?  Pt is non-verbal at times, speaks occasionally, from on assessment  - Modify environment to reduce risk of injury  - Provide assistive devices as appropriate  - Consider OT/PT consult to assist with strengthening/mobility  - Encourage toileting schedule  11/15/2020 2219 by Boone Jensen RN  Outcome: Prog patient  11/15/2020 2219 by Erlin Alvarado RN  Outcome: Progressing  11/15/2020 2218 by Erlin Alvarado RN  Outcome: Progressing     Problem: GASTROINTESTINAL - ADULT  Goal: Minimal or absence of nausea and vomiting  Description: INTERVENTIONS:  Ciaran Upton INTERVENTIONS  - Assess and document risk factors for pressure ulcer development  - Assess and document skin integrity  - Monitor for areas of redness and/or skin breakdown  - Initiate interventions, skin care algorithm/standards of care as needed  11/15/2 ordered  - Monitor response to interventions for patient's volume status, including labs, urine output, blood pressure (other measures as available)  - Encourage oral intake as appropriate  - Instruct patient on fluid and nutrition restrictions as appropri

## 2020-11-16 NOTE — PROGRESS NOTES
[place] [facility] - [facility]    Progress Note    Page Chain Patient Status:  Inpatient    3/9/1926 MRN [id number]   Location [facility] 5SW/SE Attending Abram Wood MD   Hosp Day # 5 PCP May Enter, MD     Subjective:     Non verb Subcutaneous Q12H [address]   • levothyroxine sodium  25 mcg Intravenous Daily   • pantoprazole (PROTONIX) IV push  40 mg Intravenous Q24H   • ondansetron HCl  4 mg Intravenous Once       Results:       Recent Labs   Lab 11/12/20  0721 11/13/20  0944 11/14/20  0

## 2020-11-16 NOTE — CM/SW NOTE
MSW met with pts granddaughter, Reny Navarro this morning and reviewed consents with granddtr.   MSW also reviewed GIP hospice appropriateness for pt.  granddtr was tearful all questions answered;  Copies of consents provided to pt and copies placed on [facility]

## 2020-11-17 NOTE — PROGRESS NOTES
Pt  at [facility]. Resusitation not attempted as pt was DNR.     Time of Death     Family Notified Yes   Name and Relation: Anita Aguilera of [facility] AT [employer] Notified Yes (get Rep Name and Case Number) Ede Adebayo #[id number]     co

## 2020-11-19 ENCOUNTER — TELEPHONE (OUTPATIENT)
Dept: INTERNAL MEDICINE UNIT | Facility: HOSPITAL | Age: 85
End: 2020-11-19

## 2020-11-19 NOTE — DISCHARGE SUMMARY
[facility] HOSPITALIST  DISCHARGE SUMMARY     Fei Wade Patient Status:  Inpatient    3/9/1926 MRN [id number]   Location [facility] 5SW/SE Attending No att. providers found   Hosp Day # 6 PCP Minoo Enamorado MD     Date of Admission:  intermittent suction, and he would evaluate for disimpaction and to follow up on her course with NG suction. Brief Synopsis:   Pt admitted to medicine.  rapid covid negative, CT abd/pelvis showed distal SBO with rectal fecal impaction, NGT was placed to

## 2020-11-19 NOTE — DISCHARGE SUMMARY
HPI:   Jennifer Underwood is a(n) 80year old female with h/o advanced dementia, mostly nonverbal, admitted from SNF initially on 11/10/2020 with several episodes of vomiting and hypoxemia, leukocytosis ~16k, rapid covid negative, CT abd/pelvis showed d

## 2020-11-19 NOTE — TELEPHONE ENCOUNTER
Death certificate completed and signed by Hospitalist MD Dr. Garima Granados. Submitted via fax to [phone]. Confirmation fax received.

## 2020-11-19 NOTE — H&P
[facility] Patient Status:  Inpatient    3/9/1926 MRN [id number]   Location [facility] 5SW/SE Attending No att. providers found   Hosp Day # 1 PCP Tobias Jones MD     Date:  2020 HYSTERECTOMY     • LUMPECTOMY RIGHT     • SKIN SURGERY      basal cell from the face, colon cancer     No family history on file.   Social History:  Social History    Tobacco Use      Smoking status: Former Smoker        Years: 30.00      Smokeless tobacco: Vamshi Bull MD on 11/16/2020 at 7:21 AM     Finalized by (CST): Vamshi Bull MD on 11/16/2020 at 7:25 AM          Xr Abdomen Obstructive Series Routine(2 Vw)(cpt=74019)    Result Date: 11/16/2020  CONCLUSION:  1. No evidence of bowel obstruction.     D

## 2021-05-26 VITALS
HEART RATE: 86 BPM | BODY MASS INDEX: 18.85 KG/M2 | BODY MASS INDEX: 17.36 KG/M2 | RESPIRATION RATE: 17 BRPM | DIASTOLIC BLOOD PRESSURE: 54 MMHG | OXYGEN SATURATION: 92 % | DIASTOLIC BLOOD PRESSURE: 47 MMHG | WEIGHT: 79.37 LBS | TEMPERATURE: 98.1 F | HEART RATE: 88 BPM | WEIGHT: 83.78 LBS | HEIGHT: 56 IN | OXYGEN SATURATION: 92 % | TEMPERATURE: 98.1 F | TEMPERATURE: 98.1 F | BODY MASS INDEX: 17.85 KG/M2 | RESPIRATION RATE: 17 BRPM | HEIGHT: 56 IN | SYSTOLIC BLOOD PRESSURE: 113 MMHG | DIASTOLIC BLOOD PRESSURE: 67 MMHG | RESPIRATION RATE: 17 BRPM | HEART RATE: 86 BPM | OXYGEN SATURATION: 93 % | RESPIRATION RATE: 17 BRPM | SYSTOLIC BLOOD PRESSURE: 136 MMHG | BODY MASS INDEX: 17.85 KG/M2 | HEART RATE: 93 BPM | SYSTOLIC BLOOD PRESSURE: 122 MMHG | WEIGHT: 77.16 LBS | DIASTOLIC BLOOD PRESSURE: 66 MMHG | SYSTOLIC BLOOD PRESSURE: 112 MMHG | HEIGHT: 56 IN | TEMPERATURE: 97.8 F | HEIGHT: 56 IN | OXYGEN SATURATION: 99 % | WEIGHT: 79.37 LBS

## (undated) NOTE — ED AVS SNAPSHOT
[facility] Emergency [facility]    [address]     [address] [place] [address]    Phone:  [phone]    Fax:  Chidi Morin   MRN: [id number]    Department:  [facility] Emergency [facility]   Date of Visit:  1 and Class Registration line at [phone] or find a doctor online by visiting [email or web address].    IF THERE IS ANY CHANGE OR WORSENING OF YOUR CONDITION, CALL YOUR PRIMARY CARE PHYSICIAN AT ONCE OR RETURN IMMEDIATELY TO [address] [place] [address].     If

## (undated) NOTE — ED AVS SNAPSHOT
Juan Moya   MRN: [id number]    Department:  [facility] Emergency Department   Date of Visit:  1/1/2020           Disclosure     Insurance plans vary and the physician(s) referred by the ER may not be covered by your plan.  Please contac within the next three months to obtain basic health screening including reassessment of your blood pressure.     IF THERE IS ANY CHANGE OR WORSENING OF YOUR CONDITION, CALL YOUR PRIMARY CARE PHYSICIAN AT ONCE OR RETURN IMMEDIATELY TO THE EMERGENCY DEPARTMEN

## (undated) NOTE — IP AVS SNAPSHOT
Patient Demographics     Address  [address] Phone  [phone] [facility])  [phone] (Mobile) *Preferred*      Emergency Contact(s)     Name Relation Home Work Mobile    Veronica Landaverde Relative   [phone] Stop taking on:  January 23, 2020   Ernie Preciado MD, MD         Sertraline HCl 25 MG Tabs  Commonly known as:  ZOLOFT  Next dose due: Tomorrow 9 am      Take 25 mg by mouth daily.           Vitamin B-12 1000 MCG Tabs  Commonly known as:  VITAMIN B12  Next do Author:  Rocael Mercado MD Service:  Internal Medicine Author Type:  Physician    Filed:  1/6/2020  5:43 PM Status:  Signed    :  Rocael Mercado MD (Physician)       [facility]    PATIENT'S NAME: Molly Chanel REVIEW OF SYSTEMS:  A 13-point review of systems was attempted, but was difficult to obtain because of patient's limited ability.        PHYSICAL EXAMINATION:    VITAL SIGNS:  Blood pressure 129/56, pulse 97, respiratory rate 18, temperature 98.1, pulse ox 8.   Possible placement. Will need Social Work to evaluate patient for placement, as she seems extremely weak.      Dictated By Kaylee Snyder MD  d: 01/05/2020 11:36:49  t: 01/05/2020 16:59:27  Frankie Power [id number]  MV/  [MV.1]  Electronically prediabetic.     MEDICATIONS:  At home, Advair 250/50 two inhalations q.12, Arimidex 1 mg daily, Cardizem 180 mg a day, fenofibrate 150 mg a day, omeprazole 40 mg a day, ropinirole 1 mg at bedtime, metformin 500 mg twice a day, Protonix 40 mg a day, zafirlu 1. Solu-Medrol 40 IV q.12.    2.   Antibiotic with Rocephin 1 g IV q.24, Zithromax 500 mg IV q.24, DuoNeb q.i.d. and p.r.n., and Breo 100/25 one inhalation q.24 h. Dictated By Taurus Mosqueda MD  d: 01/04/2020 16:05:10  t: 01/04/2020 16:52:12  [facility] [id number] states \" close by \" --when asked her name she stated \" I dont know\". Pt with hx of dementia and confusion per SW was admitted from [facility] . No family was present for session.   Pt does report generalized pain when questioned unab perform ankle pumps in chair and follow simple directions for ROM activity . At end of session , pt left up in chair , needs within reach , report to RN , alarm set. See vitals below as monitored throughout session .          The patient's Approx Degree Rehab Potential : Fair  Frequency (Obs): 3x/week       PHYSICAL THERAPY MEDICAL/SOCIAL HISTORY     History related to current admission:     FroPatient is a 40-year-old white female with history of dementia who presented to emergency room at [facility] 3 day Past Medical History  Past Medical History:   Diagnosis Date   • Arthritis     type unknown   • Extrinsic asthma, unspecified    • HOSPITALIZATIONS     cancer removal, frequent pneumonia   • Other and unspecified hyperlipidemia    • Restless leg syndrome After ambulation BP  129/60 HR 74 O2 sats  74--87 %  Rest provided   96 % 2L   End of session O2 sats 96 % 2L HR 74            AM-PAC '6-Clicks' INPATIENT SHORT FORM - BASIC MOBILITY  How much difficulty does the patient currently have. ..  -   Turning over Current Status    Goal #6    Goal #6  Current Status[KS. 1]         Attribution Key    KS. 1 - Shelby Esquivel, PT on 1/7/2020  2:06 PM                        Occupational Therapy Notes (last 72 hours) (Notes from 1/6/2020 12:59 PM through 1/9/2020 12 The patient's Approx Degree of Impairment: 63.03% has been calculated based on documentation in the [facility] '6 clicks' Inpatient Daily Activity Short Form. Research supports that patients with this level of impairment may benefit from JAMES.      DISCHARGE REC Initiation: cues to initiate tasks    VISION  Current Vision: no visual deficits    PERCEPTION  Overall Perception Status:   WFL - within functional limits  Communication: Communicated verbally at times but confused    Behavioral/Emotional/Social: Not plea Patient will complete toileting with Bruna  Comment:     Patient will tolerate standing for 2 minutes in prep for adls  Comment:     Comment:          Goals  on: 1/15/20  Frequency: 3x/week    Cherelle Louise MS, OTR/L  [address]  #

## (undated) NOTE — LETTER
[address] [facility]     I agree to have a Peripherally Inserted Central Catheter (PICC) placed in my arm.    1. The PICC insertion procedure, care, maintenance, risks, benefits, and complications have been explained to me b 7. The person performing this procedure has discussed the potential benefits, risks, and side effects of the PICC; the likelihood of achieving goals; and potential problems that might occur during recuperation.  They also discussed reasonable alternatives t

## (undated) NOTE — ED AVS SNAPSHOT
[facility] Emergency Department    [address]     [address] [place] [address]    Phone:  [phone]    Fax:  Chidi Morin   MRN: [id number]    Department:  [facility] Emergency Department   Date of Visit:  1 Bring a paper prescription for each of these medications    - HYDROcodone-acetaminophen 5-325 MG Tabs              Discharge Instructions       Continue your home medications. TakeTylenol 650 mg every 6 hours as needed for pain.  If you continue to have Si tiene problemas para programar [address] de seguimiento según lo indicado, llame al encargado de marya al [phone]. It is our goal to assure that you are completely satisfied with every aspect of your visit today.   In an effort to constantly impr list to your next doctor's appointment. Any imaging studies and labs completed today can be reviewed in your MyChart account. You may have had testing done that requires us to contact you. Please make sure we have your correct phone number on file.

## (undated) NOTE — IP AVS SNAPSHOT
[facility]            (For Outpatient Use Only) Initial Admit Date: 1/4/2020   Inpt/Obs Admit Date: Inpt: 1/4/20 / Obs: N/A   Discharge Date:    Quentin Rowe:  [de-identified]   MRN: [de-identified]   CSN: [id number]   CEID: [id number]        ANDREIA Subscriber Name:  Antonio Schmitz :    Subscriber ID:  Pt Rel to Subscriber:    Hospital Account Financial Class: Medicare    2020